# Patient Record
Sex: MALE | Race: WHITE | Employment: OTHER | URBAN - METROPOLITAN AREA
[De-identification: names, ages, dates, MRNs, and addresses within clinical notes are randomized per-mention and may not be internally consistent; named-entity substitution may affect disease eponyms.]

---

## 2018-04-23 ENCOUNTER — OFFICE VISIT (OUTPATIENT)
Dept: FAMILY MEDICINE CLINIC | Age: 63
End: 2018-04-23
Payer: COMMERCIAL

## 2018-04-23 VITALS
WEIGHT: 187 LBS | SYSTOLIC BLOOD PRESSURE: 138 MMHG | HEART RATE: 68 BPM | OXYGEN SATURATION: 98 % | DIASTOLIC BLOOD PRESSURE: 88 MMHG | BODY MASS INDEX: 25.36 KG/M2

## 2018-04-23 DIAGNOSIS — J40 BRONCHITIS: ICD-10-CM

## 2018-04-23 DIAGNOSIS — Z82.49 FAMILY HISTORY OF EARLY CAD: ICD-10-CM

## 2018-04-23 DIAGNOSIS — I10 ESSENTIAL HYPERTENSION: Primary | ICD-10-CM

## 2018-04-23 PROCEDURE — 99213 OFFICE O/P EST LOW 20 MIN: CPT | Performed by: FAMILY MEDICINE

## 2018-04-23 RX ORDER — MELOXICAM 15 MG/1
15 TABLET ORAL DAILY
Qty: 30 TABLET | Refills: 5
Start: 2018-04-23 | End: 2018-06-04 | Stop reason: SDUPTHER

## 2018-04-23 RX ORDER — PREDNISONE 20 MG/1
40 TABLET ORAL DAILY
Qty: 10 TABLET | Refills: 0 | Status: SHIPPED | OUTPATIENT
Start: 2018-04-23 | End: 2018-04-28

## 2018-04-23 RX ORDER — LOSARTAN POTASSIUM 100 MG/1
100 TABLET ORAL DAILY
Qty: 30 TABLET | Refills: 5 | Status: SHIPPED | OUTPATIENT
Start: 2018-04-23 | End: 2018-05-07

## 2018-04-23 RX ORDER — AZITHROMYCIN 250 MG/1
TABLET, FILM COATED ORAL
Qty: 1 PACKET | Refills: 0 | Status: SHIPPED | OUTPATIENT
Start: 2018-04-23 | End: 2018-04-27

## 2018-04-23 ASSESSMENT — ENCOUNTER SYMPTOMS
EYE ITCHING: 0
EYE PAIN: 0
ABDOMINAL PAIN: 0
WHEEZING: 0
COUGH: 1
SORE THROAT: 0
FACIAL SWELLING: 0
CHEST TIGHTNESS: 0
CHOKING: 0
SINUS PRESSURE: 1
SHORTNESS OF BREATH: 0
EYE DISCHARGE: 0
NAUSEA: 0
STRIDOR: 0
BACK PAIN: 0
DIARRHEA: 0
ANAL BLEEDING: 0
ABDOMINAL DISTENTION: 0
RHINORRHEA: 1
TROUBLE SWALLOWING: 0
SINUS PAIN: 1
VOMITING: 0
PHOTOPHOBIA: 0
COLOR CHANGE: 0
BLOOD IN STOOL: 0
VOICE CHANGE: 0
APNEA: 0
CONSTIPATION: 0
RECTAL PAIN: 0
EYE REDNESS: 0

## 2018-04-23 ASSESSMENT — PATIENT HEALTH QUESTIONNAIRE - PHQ9
2. FEELING DOWN, DEPRESSED OR HOPELESS: 0
1. LITTLE INTEREST OR PLEASURE IN DOING THINGS: 0
1. LITTLE INTEREST OR PLEASURE IN DOING THINGS: 0
SUM OF ALL RESPONSES TO PHQ9 QUESTIONS 1 & 2: 0
SUM OF ALL RESPONSES TO PHQ QUESTIONS 1-9: 0
SUM OF ALL RESPONSES TO PHQ9 QUESTIONS 1 & 2: 0
2. FEELING DOWN, DEPRESSED OR HOPELESS: 0
SUM OF ALL RESPONSES TO PHQ QUESTIONS 1-9: 0

## 2018-04-30 DIAGNOSIS — Z12.11 COLON CANCER SCREENING: Primary | ICD-10-CM

## 2018-04-30 LAB
CONTROL: NORMAL
HEMOCCULT STL QL: NEGATIVE

## 2018-04-30 PROCEDURE — 82274 ASSAY TEST FOR BLOOD FECAL: CPT | Performed by: FAMILY MEDICINE

## 2018-05-07 ENCOUNTER — TELEPHONE (OUTPATIENT)
Dept: FAMILY MEDICINE CLINIC | Age: 63
End: 2018-05-07

## 2018-05-07 DIAGNOSIS — R07.9 CHEST PAIN, UNSPECIFIED TYPE: Primary | ICD-10-CM

## 2018-05-09 RX ORDER — HYDROCHLOROTHIAZIDE 25 MG/1
25 TABLET ORAL DAILY
Qty: 30 TABLET | Refills: 5 | Status: SHIPPED | OUTPATIENT
Start: 2018-05-09 | End: 2018-11-14 | Stop reason: SDUPTHER

## 2018-05-11 ENCOUNTER — TELEPHONE (OUTPATIENT)
Dept: FAMILY MEDICINE CLINIC | Age: 63
End: 2018-05-11

## 2018-05-11 DIAGNOSIS — R07.9 CHEST PAIN, UNSPECIFIED TYPE: Primary | ICD-10-CM

## 2018-05-16 ENCOUNTER — HOSPITAL ENCOUNTER (OUTPATIENT)
Dept: NUCLEAR MEDICINE | Age: 63
Discharge: HOME | End: 2018-05-16
Payer: COMMERCIAL

## 2018-05-16 ENCOUNTER — TELEPHONE (OUTPATIENT)
Dept: ADMINISTRATIVE | Age: 63
End: 2018-05-16

## 2018-05-16 ENCOUNTER — HOSPITAL ENCOUNTER (OUTPATIENT)
Dept: NON INVASIVE DIAGNOSTICS | Age: 63
Discharge: HOME | End: 2018-05-16
Payer: COMMERCIAL

## 2018-05-16 VITALS — BODY MASS INDEX: 24.79 KG/M2 | HEIGHT: 72 IN | WEIGHT: 183 LBS

## 2018-05-16 DIAGNOSIS — R07.9 CHEST PAIN, UNSPECIFIED TYPE: ICD-10-CM

## 2018-05-16 LAB
LV EF: 53 %
LVEF MODALITY: NORMAL

## 2018-05-16 PROCEDURE — A9500 TC99M SESTAMIBI: HCPCS | Performed by: RADIOLOGY

## 2018-05-16 PROCEDURE — 3430000000 HC RX DIAGNOSTIC RADIOPHARMACEUTICAL: Performed by: RADIOLOGY

## 2018-05-16 PROCEDURE — 78452 HT MUSCLE IMAGE SPECT MULT: CPT

## 2018-05-16 PROCEDURE — 6360000002 HC RX W HCPCS: Performed by: INTERNAL MEDICINE

## 2018-05-16 PROCEDURE — 93017 CV STRESS TEST TRACING ONLY: CPT

## 2018-05-16 RX ORDER — AMLODIPINE BESYLATE 10 MG/1
10 TABLET ORAL DAILY
Qty: 30 TABLET | Refills: 3 | Status: CANCELLED | OUTPATIENT
Start: 2018-05-16

## 2018-05-16 RX ORDER — AMLODIPINE BESYLATE 10 MG/1
10 TABLET ORAL DAILY
Qty: 30 TABLET | Refills: 3 | OUTPATIENT
Start: 2018-05-16 | End: 2018-06-04

## 2018-05-16 RX ADMIN — REGADENOSON 0.4 MG: 0.08 INJECTION, SOLUTION INTRAVENOUS at 10:17

## 2018-05-16 RX ADMIN — Medication 30 MILLICURIE: at 10:21

## 2018-05-16 RX ADMIN — Medication 10 MILLICURIE: at 08:11

## 2018-05-17 ENCOUNTER — TELEPHONE (OUTPATIENT)
Dept: FAMILY MEDICINE CLINIC | Age: 63
End: 2018-05-17

## 2018-06-04 ENCOUNTER — OFFICE VISIT (OUTPATIENT)
Dept: FAMILY MEDICINE CLINIC | Age: 63
End: 2018-06-04
Payer: COMMERCIAL

## 2018-06-04 DIAGNOSIS — I10 ESSENTIAL HYPERTENSION: ICD-10-CM

## 2018-06-04 DIAGNOSIS — G89.29 CHRONIC BILATERAL LOW BACK PAIN WITHOUT SCIATICA: Primary | ICD-10-CM

## 2018-06-04 DIAGNOSIS — M54.50 CHRONIC BILATERAL LOW BACK PAIN WITHOUT SCIATICA: Primary | ICD-10-CM

## 2018-06-04 PROCEDURE — 99213 OFFICE O/P EST LOW 20 MIN: CPT | Performed by: FAMILY MEDICINE

## 2018-06-04 RX ORDER — MELOXICAM 15 MG/1
15 TABLET ORAL DAILY
Qty: 30 TABLET | Refills: 5 | Status: SHIPPED | OUTPATIENT
Start: 2018-06-04 | End: 2018-11-14 | Stop reason: SDUPTHER

## 2018-06-09 VITALS
HEART RATE: 64 BPM | SYSTOLIC BLOOD PRESSURE: 132 MMHG | OXYGEN SATURATION: 98 % | BODY MASS INDEX: 25.23 KG/M2 | DIASTOLIC BLOOD PRESSURE: 80 MMHG | WEIGHT: 186 LBS

## 2018-06-09 PROBLEM — I10 ESSENTIAL HYPERTENSION: Status: ACTIVE | Noted: 2018-06-09

## 2018-06-09 ASSESSMENT — ENCOUNTER SYMPTOMS
SINUS PRESSURE: 0
STRIDOR: 0
CHEST TIGHTNESS: 0
VOMITING: 0
VOICE CHANGE: 0
CHOKING: 0
ANAL BLEEDING: 0
RECTAL PAIN: 0
RHINORRHEA: 0
CONSTIPATION: 0
BLOOD IN STOOL: 0
COUGH: 0
WHEEZING: 0
NAUSEA: 0
SHORTNESS OF BREATH: 0
APNEA: 0
ABDOMINAL DISTENTION: 0
ABDOMINAL PAIN: 0
DIARRHEA: 0
FACIAL SWELLING: 0
TROUBLE SWALLOWING: 0
SORE THROAT: 0
COLOR CHANGE: 0

## 2018-07-16 ENCOUNTER — TELEPHONE (OUTPATIENT)
Dept: ADMINISTRATIVE | Age: 63
End: 2018-07-16

## 2018-07-16 DIAGNOSIS — I10 ESSENTIAL HYPERTENSION: ICD-10-CM

## 2018-07-16 NOTE — TELEPHONE ENCOUNTER
Pt called to cancel his appt for today 7/16 due to he has another appt that he could not cancel, he is rescheduled for 7/26.

## 2018-07-17 DIAGNOSIS — I10 ESSENTIAL HYPERTENSION: ICD-10-CM

## 2018-07-26 ENCOUNTER — OFFICE VISIT (OUTPATIENT)
Dept: FAMILY MEDICINE CLINIC | Age: 63
End: 2018-07-26
Payer: COMMERCIAL

## 2018-07-26 DIAGNOSIS — I10 ESSENTIAL HYPERTENSION: Primary | ICD-10-CM

## 2018-07-26 PROCEDURE — 99212 OFFICE O/P EST SF 10 MIN: CPT | Performed by: FAMILY MEDICINE

## 2018-07-26 ASSESSMENT — PATIENT HEALTH QUESTIONNAIRE - PHQ9
SUM OF ALL RESPONSES TO PHQ QUESTIONS 1-9: 0
2. FEELING DOWN, DEPRESSED OR HOPELESS: 0
1. LITTLE INTEREST OR PLEASURE IN DOING THINGS: 0
SUM OF ALL RESPONSES TO PHQ9 QUESTIONS 1 & 2: 0

## 2018-07-27 VITALS
HEART RATE: 55 BPM | BODY MASS INDEX: 24.95 KG/M2 | OXYGEN SATURATION: 98 % | DIASTOLIC BLOOD PRESSURE: 80 MMHG | SYSTOLIC BLOOD PRESSURE: 132 MMHG | WEIGHT: 184 LBS

## 2018-07-27 ASSESSMENT — ENCOUNTER SYMPTOMS
STRIDOR: 0
SORE THROAT: 0
COUGH: 0
SINUS PRESSURE: 0
RECTAL PAIN: 0
VOICE CHANGE: 0
CHOKING: 0
DIARRHEA: 0
ABDOMINAL PAIN: 0
NAUSEA: 0
ANAL BLEEDING: 0
SHORTNESS OF BREATH: 0
ABDOMINAL DISTENTION: 0
CHEST TIGHTNESS: 0
RHINORRHEA: 0
APNEA: 0
TROUBLE SWALLOWING: 0
CONSTIPATION: 0
FACIAL SWELLING: 0
WHEEZING: 0
VOMITING: 0
COLOR CHANGE: 0
BLOOD IN STOOL: 0

## 2018-10-04 ENCOUNTER — TELEPHONE (OUTPATIENT)
Dept: FAMILY MEDICINE CLINIC | Age: 63
End: 2018-10-04

## 2018-10-04 NOTE — LETTER
Protective [address] [place] [id number]  Phone: [phone]  Fax: [address], [place]        October 4, 2018    To whom it may concern in [place],    I am writing this letter in regards to my patient, Laly Silveira (1955). I have been following Mr. Torrie Osorio for approximately two years now from a medical standpoint. When Mr. Torrie Osorio was in my office this past April, we were discussing family history medical issues that could significantly impact his health status. It was stated that my patient has a family history of premature coronary artery disease. He was not complaining of chest pain, shortness of breathe, or any other symptoms to warrant cardiac testing at that time. The cardiac stress test, known as Estuardo Brown, was ordered for the sole purpose of a preventative measure given the fact that he has family members whom suffered from premature cardiac disease. Patient was subsequently seen in my office for symptoms of dizziness related to antihypertensive medication changes. At no time was he experiencing chest pain. Please do not hesitate to call my office at the above number and speak with my nurse, Rabia Macedo RN, at extension [phone]. Thank you for your time and consideration in this matter.        Sincerely,          DO Rabia Andrew RN

## 2018-11-12 DIAGNOSIS — M54.50 CHRONIC BILATERAL LOW BACK PAIN WITHOUT SCIATICA: ICD-10-CM

## 2018-11-12 DIAGNOSIS — G89.29 CHRONIC BILATERAL LOW BACK PAIN WITHOUT SCIATICA: ICD-10-CM

## 2018-11-12 DIAGNOSIS — I10 ESSENTIAL HYPERTENSION: ICD-10-CM

## 2018-11-13 ENCOUNTER — TELEPHONE (OUTPATIENT)
Dept: FAMILY MEDICINE CLINIC | Age: 63
End: 2018-11-13

## 2018-11-13 DIAGNOSIS — G89.29 CHRONIC BILATERAL LOW BACK PAIN WITHOUT SCIATICA: ICD-10-CM

## 2018-11-13 DIAGNOSIS — M54.50 CHRONIC BILATERAL LOW BACK PAIN WITHOUT SCIATICA: ICD-10-CM

## 2018-11-13 DIAGNOSIS — I10 ESSENTIAL HYPERTENSION: ICD-10-CM

## 2018-11-13 RX ORDER — HYDROCHLOROTHIAZIDE 25 MG/1
25 TABLET ORAL DAILY
Qty: 30 TABLET | Refills: 5 | Status: CANCELLED | OUTPATIENT
Start: 2018-11-13

## 2018-11-13 RX ORDER — MELOXICAM 15 MG/1
15 TABLET ORAL DAILY
Qty: 30 TABLET | Refills: 5 | Status: CANCELLED | OUTPATIENT
Start: 2018-11-13

## 2018-11-14 RX ORDER — HYDROCHLOROTHIAZIDE 25 MG/1
25 TABLET ORAL DAILY
Qty: 30 TABLET | Refills: 1 | Status: SHIPPED | OUTPATIENT
Start: 2018-11-14 | End: 2019-01-17 | Stop reason: SDUPTHER

## 2018-11-14 RX ORDER — MELOXICAM 15 MG/1
15 TABLET ORAL DAILY
Qty: 30 TABLET | Refills: 1 | Status: SHIPPED | OUTPATIENT
Start: 2018-11-14 | End: 2019-03-11 | Stop reason: SDUPTHER

## 2019-01-17 DIAGNOSIS — I10 ESSENTIAL HYPERTENSION: ICD-10-CM

## 2019-01-18 RX ORDER — HYDROCHLOROTHIAZIDE 25 MG/1
25 TABLET ORAL DAILY
Qty: 30 TABLET | Refills: 1 | Status: SHIPPED | OUTPATIENT
Start: 2019-01-18 | End: 2019-03-15 | Stop reason: SDUPTHER

## 2019-03-11 DIAGNOSIS — M54.50 CHRONIC BILATERAL LOW BACK PAIN WITHOUT SCIATICA: ICD-10-CM

## 2019-03-11 DIAGNOSIS — G89.29 CHRONIC BILATERAL LOW BACK PAIN WITHOUT SCIATICA: ICD-10-CM

## 2019-03-11 RX ORDER — MELOXICAM 15 MG/1
15 TABLET ORAL DAILY
Qty: 90 TABLET | Refills: 0 | Status: SHIPPED | OUTPATIENT
Start: 2019-03-11 | End: 2019-03-15 | Stop reason: SDUPTHER

## 2019-03-15 DIAGNOSIS — M54.50 CHRONIC BILATERAL LOW BACK PAIN WITHOUT SCIATICA: ICD-10-CM

## 2019-03-15 DIAGNOSIS — I10 ESSENTIAL HYPERTENSION: ICD-10-CM

## 2019-03-15 DIAGNOSIS — G89.29 CHRONIC BILATERAL LOW BACK PAIN WITHOUT SCIATICA: ICD-10-CM

## 2019-03-15 RX ORDER — MELOXICAM 15 MG/1
15 TABLET ORAL DAILY
Qty: 30 TABLET | Refills: 0 | Status: SHIPPED
Start: 2019-03-15 | End: 2020-11-16

## 2019-03-15 RX ORDER — HYDROCHLOROTHIAZIDE 25 MG/1
25 TABLET ORAL DAILY
Qty: 30 TABLET | Refills: 1 | Status: SHIPPED | OUTPATIENT
Start: 2019-03-15 | End: 2019-04-22 | Stop reason: SDUPTHER

## 2019-04-22 ENCOUNTER — OFFICE VISIT (OUTPATIENT)
Dept: FAMILY MEDICINE CLINIC | Age: 64
End: 2019-04-22
Payer: COMMERCIAL

## 2019-04-22 VITALS
HEIGHT: 72 IN | HEART RATE: 68 BPM | BODY MASS INDEX: 25.87 KG/M2 | DIASTOLIC BLOOD PRESSURE: 80 MMHG | SYSTOLIC BLOOD PRESSURE: 120 MMHG | OXYGEN SATURATION: 96 % | WEIGHT: 191 LBS

## 2019-04-22 DIAGNOSIS — R20.2 PARESTHESIA AND PAIN OF BOTH UPPER EXTREMITIES: Primary | ICD-10-CM

## 2019-04-22 DIAGNOSIS — M79.602 PARESTHESIA AND PAIN OF BOTH UPPER EXTREMITIES: Primary | ICD-10-CM

## 2019-04-22 DIAGNOSIS — M79.601 PARESTHESIA AND PAIN OF BOTH UPPER EXTREMITIES: Primary | ICD-10-CM

## 2019-04-22 DIAGNOSIS — I10 ESSENTIAL HYPERTENSION: ICD-10-CM

## 2019-04-22 PROCEDURE — 99213 OFFICE O/P EST LOW 20 MIN: CPT | Performed by: FAMILY MEDICINE

## 2019-04-22 RX ORDER — TIZANIDINE 4 MG/1
4 TABLET ORAL NIGHTLY
Qty: 30 TABLET | Refills: 5 | Status: SHIPPED
Start: 2019-04-22 | End: 2020-11-16

## 2019-04-22 RX ORDER — HYDROCHLOROTHIAZIDE 25 MG/1
25 TABLET ORAL DAILY
Qty: 90 TABLET | Refills: 5 | Status: SHIPPED
Start: 2019-04-22 | End: 2020-05-14 | Stop reason: SDUPTHER

## 2019-04-22 RX ORDER — DEXAMETHASONE 4 MG/1
4 TABLET ORAL 2 TIMES DAILY WITH MEALS
Qty: 28 TABLET | Refills: 0 | Status: SHIPPED | OUTPATIENT
Start: 2019-04-22 | End: 2019-05-06

## 2019-04-22 RX ORDER — TIZANIDINE 4 MG/1
4 TABLET ORAL NIGHTLY
Qty: 30 TABLET | Refills: 5 | Status: SHIPPED | OUTPATIENT
Start: 2019-04-22 | End: 2019-04-22 | Stop reason: SDUPTHER

## 2019-04-22 ASSESSMENT — PATIENT HEALTH QUESTIONNAIRE - PHQ9
1. LITTLE INTEREST OR PLEASURE IN DOING THINGS: 0
SUM OF ALL RESPONSES TO PHQ9 QUESTIONS 1 & 2: 0
2. FEELING DOWN, DEPRESSED OR HOPELESS: 0
SUM OF ALL RESPONSES TO PHQ QUESTIONS 1-9: 0
SUM OF ALL RESPONSES TO PHQ QUESTIONS 1-9: 0

## 2019-04-23 ASSESSMENT — ENCOUNTER SYMPTOMS: SHORTNESS OF BREATH: 0

## 2019-04-24 NOTE — PROGRESS NOTES
Smoker    Smokeless tobacco: Never Used   Substance and Sexual Activity    Alcohol use: Yes     Comment: socailly    Drug use: No    Sexual activity: Not on file   Lifestyle    Physical activity:     Days per week: Not on file     Minutes per session: Not on file    Stress: Not on file   Relationships    Social connections:     Talks on phone: Not on file     Gets together: Not on file     Attends Shinto service: Not on file     Active member of club or organization: Not on file     Attends meetings of clubs or organizations: Not on file     Relationship status: Not on file    Intimate partner violence:     Fear of current or ex partner: Not on file     Emotionally abused: Not on file     Physically abused: Not on file     Forced sexual activity: Not on file   Other Topics Concern    Not on file   Social History Narrative    Not on file       Family History   Problem Relation Age of Onset    COPD Father     Emphysema Father     Coronary Art Dis Maternal Grandfather        Current Outpatient Medications on File Prior to Visit   Medication Sig Dispense Refill    meloxicam (MOBIC) 15 MG tablet Take 1 tablet by mouth daily 30 tablet 0     No current facility-administered medications on file prior to visit. Allergies   Allergen Reactions    Cozaar [Losartan] Other (See Comments)     Dizziness, lightheaded, jittery       I have reviewed his allergies, medications, problem list, medical,social and family history and have updated as needed in the electronic medical record. Objective:     Physical Exam   Constitutional: He appears well-developed and well-nourished. No distress. HENT:   Head: Normocephalic and atraumatic. Right Ear: External ear normal.   Left Ear: External ear normal.   Nose: Nose normal.   Mouth/Throat: Oropharynx is clear and moist. No oropharyngeal exudate. Cardiovascular: Normal rate, regular rhythm and intact distal pulses. Exam reveals no gallop and no friction rub.

## 2020-05-14 ENCOUNTER — VIRTUAL VISIT (OUTPATIENT)
Dept: FAMILY MEDICINE CLINIC | Age: 65
End: 2020-05-14
Payer: COMMERCIAL

## 2020-05-14 VITALS — HEIGHT: 72 IN | WEIGHT: 187 LBS | BODY MASS INDEX: 25.33 KG/M2

## 2020-05-14 PROCEDURE — 99213 OFFICE O/P EST LOW 20 MIN: CPT | Performed by: FAMILY MEDICINE

## 2020-05-14 RX ORDER — HYOSCYAMINE SULFATE 0.125 MG
0.12 TABLET ORAL EVERY 4 HOURS PRN
Qty: 90 TABLET | Refills: 5 | Status: SHIPPED
Start: 2020-05-14 | End: 2020-11-16

## 2020-05-14 RX ORDER — HYDROCHLOROTHIAZIDE 25 MG/1
25 TABLET ORAL DAILY
Qty: 90 TABLET | Refills: 5 | Status: ON HOLD
Start: 2020-05-14 | End: 2020-11-17 | Stop reason: HOSPADM

## 2020-05-14 RX ORDER — PROPRANOLOL HCL 60 MG
60 CAPSULE, EXTENDED RELEASE 24HR ORAL DAILY
Qty: 30 CAPSULE | Refills: 5 | Status: SHIPPED
Start: 2020-05-14 | End: 2020-05-15

## 2020-05-14 ASSESSMENT — PATIENT HEALTH QUESTIONNAIRE - PHQ9
SUM OF ALL RESPONSES TO PHQ QUESTIONS 1-9: 0
SUM OF ALL RESPONSES TO PHQ QUESTIONS 1-9: 0
1. LITTLE INTEREST OR PLEASURE IN DOING THINGS: 0
SUM OF ALL RESPONSES TO PHQ9 QUESTIONS 1 & 2: 0
2. FEELING DOWN, DEPRESSED OR HOPELESS: 0

## 2020-05-15 RX ORDER — PROPRANOLOL HCL 60 MG
60 CAPSULE, EXTENDED RELEASE 24HR ORAL DAILY
Qty: 90 CAPSULE | Refills: 5 | Status: ON HOLD
Start: 2020-05-15 | End: 2020-11-17 | Stop reason: HOSPADM

## 2020-05-19 ASSESSMENT — ENCOUNTER SYMPTOMS
NAUSEA: 0
CONSTIPATION: 0
COUGH: 0
ANAL BLEEDING: 0
FACIAL SWELLING: 0
RHINORRHEA: 0
EYE DISCHARGE: 0
CHEST TIGHTNESS: 0
ABDOMINAL DISTENTION: 0
COLOR CHANGE: 0
EYE PAIN: 0
VOMITING: 0
BLOOD IN STOOL: 0
TROUBLE SWALLOWING: 0
WHEEZING: 0
BACK PAIN: 0
STRIDOR: 0
SORE THROAT: 0
SINUS PRESSURE: 0
PHOTOPHOBIA: 0
RECTAL PAIN: 0
CHOKING: 0
VOICE CHANGE: 0
EYE ITCHING: 0
EYE REDNESS: 0
ABDOMINAL PAIN: 0
APNEA: 0
DIARRHEA: 1
SHORTNESS OF BREATH: 0

## 2020-05-19 NOTE — PROGRESS NOTES
TeleMedicine Video Visit    This visit was performed as a virtual video visit using a synchronous, two-way, audio-video telehealth technology platform. Patient identification was verified at the start of the visit, including the patient's telephone number and physical location. I discussed with the patient the nature of our telehealth visits, that:     1. Due to the nature of an audio- video modality, the only components of a physical exam that could be done are the elements supported by direct observation. 2. I would evaluate the patient and recommend diagnostics and treatments based on my assessment. 3. If it was felt that the patient should be evaluated in clinic or an emergency room setting, then they would be directed there. 4. Our sessions are not being recorded and that personal health information is protected. 5. Our team would provide follow up care in person if/when the patient needs it. Patient does agree to proceed with telemedicine consultation. Patient's location: home address in [facility]  Physician  location other address in [facility] other people involved in call, patient only      Time spent: Greater than 15    This visit was completed virtually using Doxy. me   Augustus Ayala is a 60 y.o. male  . Subjective:      Patients blood pressure has actually really improved. Was feeling well on propranolol. However, the has been having low heart rate in 40s. No real orthostatic complaints, however we discussed at length lowering dose. He will watch blood pressure and report any increase. Main concern is IBS with certain foods. Hypertension   This is a chronic problem. The current episode started more than 1 year ago. The problem is unchanged. The problem is controlled. Pertinent negatives include no chest pain, headaches, neck pain, palpitations or shortness of breath. There are no associated agents to hypertension. Risk factors for coronary artery disease include male gender and dyslipidemia.  Past Comments: No rash   Neurological:      General: No focal deficit present. Mental Status: He is alert and oriented to person, place, and time. Psychiatric:         Mood and Affect: Mood normal.         Behavior: Behavior normal.         Assessment / Plan:   Sharyle Sola was seen today for hypertension and irritable bowel syndrome. Diagnoses and all orders for this visit:    Diarrhea, unspecified type  -     Cologuard (For External Results Only); Future  -     Comprehensive Metabolic Panel; Future  -     CBC Auto Differential; Future  -     Hemoglobin A1C; Future  -     TSH without Reflex; Future  -     Lipid Panel; Future  -     Psa screening; Future  -     GLIADIN ANTIBODIES, SERUM; Future    Essential hypertension  -     hydroCHLOROthiazide (HYDRODIURIL) 25 MG tablet; Take 1 tablet by mouth daily  -     Cologuard (For External Results Only); Future  -     Comprehensive Metabolic Panel; Future  -     CBC Auto Differential; Future  -     Hemoglobin A1C; Future  -     TSH without Reflex; Future  -     Lipid Panel; Future  -     Psa screening; Future  -     GLIADIN ANTIBODIES, SERUM; Future    Irritable bowel syndrome with diarrhea  -     Cologuard (For External Results Only); Future  -     Comprehensive Metabolic Panel; Future  -     CBC Auto Differential; Future  -     Hemoglobin A1C; Future  -     TSH without Reflex; Future  -     Lipid Panel; Future  -     Psa screening; Future  -     GLIADIN ANTIBODIES, SERUM; Future  -     hyoscyamine (LEVSIN) 125 MCG tablet; Take 1 tablet by mouth every 4 hours as needed for Cramping or Diarrhea    IFG (impaired fasting glucose)  -     Cologuard (For External Results Only); Future  -     Comprehensive Metabolic Panel; Future  -     CBC Auto Differential; Future  -     Hemoglobin A1C; Future  -     TSH without Reflex; Future  -     Lipid Panel; Future  -     Psa screening;  Future  -     GLIADIN ANTIBODIES, SERUM; Future    Mixed hyperlipidemia  -     Cologuard (For External

## 2020-07-21 ENCOUNTER — TELEPHONE (OUTPATIENT)
Dept: FAMILY MEDICINE CLINIC | Age: 65
End: 2020-07-21

## 2020-07-21 RX ORDER — METRONIDAZOLE 500 MG/1
500 TABLET ORAL 3 TIMES DAILY
Qty: 30 TABLET | Refills: 0 | Status: SHIPPED | OUTPATIENT
Start: 2020-07-21 | End: 2020-07-31

## 2020-07-21 RX ORDER — DIPHENOXYLATE HYDROCHLORIDE AND ATROPINE SULFATE 2.5; .025 MG/1; MG/1
TABLET ORAL
Qty: 90 TABLET | Refills: 3 | Status: SHIPPED | OUTPATIENT
Start: 2020-07-21 | End: 2020-07-31

## 2020-07-21 NOTE — TELEPHONE ENCOUNTER
Patient phoned stating that he has been having diarrhea since around 4th of July. Happens after eating and between. Stomach feels like it is in knots. Has had these \"flair ups\" before but is becoming more frequent. Has tried imodium and the prescribed hyoscyamine and nothing helps. Please advise. Thanks.

## 2020-07-22 NOTE — TELEPHONE ENCOUNTER
LMOM for patient that new medication was sent to the pharmacy and if does not work or has any other questions to please contact office.

## 2020-08-04 ENCOUNTER — HOSPITAL ENCOUNTER (OUTPATIENT)
Age: 65
Discharge: HOME | End: 2020-08-06
Payer: COMMERCIAL

## 2020-08-04 LAB
ALBUMIN SERPL-MCNC: 4.4 G/DL (ref 3.5–5.2)
ALP BLD-CCNC: 94 U/L (ref 40–129)
ALT SERPL-CCNC: 41 U/L (ref 0–40)
ANION GAP SERPL CALCULATED.3IONS-SCNC: 17 MMOL/L (ref 7–16)
AST SERPL-CCNC: 39 U/L (ref 0–39)
BASOPHILS ABSOLUTE: 0.03 E9/L (ref 0–0.2)
BASOPHILS RELATIVE PERCENT: 0.5 % (ref 0–2)
BILIRUB SERPL-MCNC: 0.4 MG/DL (ref 0–1.2)
BUN BLDV-MCNC: 13 MG/DL (ref 8–23)
CALCIUM SERPL-MCNC: 9.9 MG/DL (ref 8.6–10.2)
CHLORIDE BLD-SCNC: 99 MMOL/L (ref 98–107)
CHOLESTEROL, TOTAL: 199 MG/DL (ref 0–199)
CO2: 22 MMOL/L (ref 22–29)
CREAT SERPL-MCNC: 1 MG/DL (ref 0.7–1.2)
EOSINOPHILS ABSOLUTE: 0.09 E9/L (ref 0.05–0.5)
EOSINOPHILS RELATIVE PERCENT: 1.4 % (ref 0–6)
GFR AFRICAN AMERICAN: >60
GFR NON-AFRICAN AMERICAN: >60 ML/MIN/1.73
GLUCOSE BLD-MCNC: 151 MG/DL (ref 74–99)
HBA1C MFR BLD: 6.2 % (ref 4–5.6)
HCT VFR BLD CALC: 46.6 % (ref 37–54)
HDLC SERPL-MCNC: 39 MG/DL
HEMOGLOBIN: 15.6 G/DL (ref 12.5–16.5)
IMMATURE GRANULOCYTES #: 0.03 E9/L
IMMATURE GRANULOCYTES %: 0.5 % (ref 0–5)
LDL CHOLESTEROL CALCULATED: 125 MG/DL (ref 0–99)
LYMPHOCYTES ABSOLUTE: 0.79 E9/L (ref 1.5–4)
LYMPHOCYTES RELATIVE PERCENT: 12.2 % (ref 20–42)
MCH RBC QN AUTO: 30.8 PG (ref 26–35)
MCHC RBC AUTO-ENTMCNC: 33.5 % (ref 32–34.5)
MCV RBC AUTO: 91.9 FL (ref 80–99.9)
MONOCYTES ABSOLUTE: 0.67 E9/L (ref 0.1–0.95)
MONOCYTES RELATIVE PERCENT: 10.3 % (ref 2–12)
NEUTROPHILS ABSOLUTE: 4.88 E9/L (ref 1.8–7.3)
NEUTROPHILS RELATIVE PERCENT: 75.1 % (ref 43–80)
PDW BLD-RTO: 12.6 FL (ref 11.5–15)
PLATELET # BLD: 269 E9/L (ref 130–450)
PMV BLD AUTO: 10.6 FL (ref 7–12)
POTASSIUM SERPL-SCNC: 4.5 MMOL/L (ref 3.5–5)
PROSTATE SPECIFIC ANTIGEN: 0.54 NG/ML (ref 0–4)
RBC # BLD: 5.07 E12/L (ref 3.8–5.8)
SODIUM BLD-SCNC: 138 MMOL/L (ref 132–146)
TOTAL PROTEIN: 7.1 G/DL (ref 6.4–8.3)
TRIGL SERPL-MCNC: 177 MG/DL (ref 0–149)
TSH SERPL DL<=0.05 MIU/L-ACNC: 2.07 UIU/ML (ref 0.27–4.2)
VLDLC SERPL CALC-MCNC: 35 MG/DL
WBC # BLD: 6.5 E9/L (ref 4.5–11.5)

## 2020-08-04 PROCEDURE — 36415 COLL VENOUS BLD VENIPUNCTURE: CPT

## 2020-08-04 PROCEDURE — 80053 COMPREHEN METABOLIC PANEL: CPT

## 2020-08-04 PROCEDURE — G0103 PSA SCREENING: HCPCS

## 2020-08-04 PROCEDURE — 85025 COMPLETE CBC W/AUTO DIFF WBC: CPT

## 2020-08-04 PROCEDURE — 83516 IMMUNOASSAY NONANTIBODY: CPT

## 2020-08-04 PROCEDURE — 84443 ASSAY THYROID STIM HORMONE: CPT

## 2020-08-04 PROCEDURE — 80061 LIPID PANEL: CPT

## 2020-08-04 PROCEDURE — 83036 HEMOGLOBIN GLYCOSYLATED A1C: CPT

## 2020-08-06 LAB
GLIADIN ANTIBODIES IGA: 3 UNITS (ref 0–19)
GLIADIN ANTIBODIES IGG: 2 UNITS (ref 0–19)

## 2020-09-02 ENCOUNTER — VIRTUAL VISIT (OUTPATIENT)
Dept: FAMILY MEDICINE CLINIC | Age: 65
End: 2020-09-02
Payer: COMMERCIAL

## 2020-09-02 PROCEDURE — 99213 OFFICE O/P EST LOW 20 MIN: CPT | Performed by: FAMILY MEDICINE

## 2020-09-02 RX ORDER — DICYCLOMINE HYDROCHLORIDE 10 MG/1
10 CAPSULE ORAL 4 TIMES DAILY
Qty: 120 CAPSULE | Refills: 3 | Status: SHIPPED
Start: 2020-09-02 | End: 2020-11-16 | Stop reason: ALTCHOICE

## 2020-09-09 ASSESSMENT — ENCOUNTER SYMPTOMS
NAUSEA: 0
SORE THROAT: 0
RHINORRHEA: 0
SHORTNESS OF BREATH: 0
COUGH: 0
APNEA: 0
ABDOMINAL PAIN: 0
EYE PAIN: 0
CHEST TIGHTNESS: 0
BACK PAIN: 0
FACIAL SWELLING: 0
RECTAL PAIN: 0
VOMITING: 0
ABDOMINAL DISTENTION: 0
EYE REDNESS: 0
DIARRHEA: 1
TROUBLE SWALLOWING: 0
ANAL BLEEDING: 0
CONSTIPATION: 0
PHOTOPHOBIA: 0
CHOKING: 0
STRIDOR: 0
EYE ITCHING: 0
SINUS PRESSURE: 0
VOICE CHANGE: 0
COLOR CHANGE: 0
EYE DISCHARGE: 0
BLOOD IN STOOL: 0
WHEEZING: 0

## 2020-09-09 NOTE — PROGRESS NOTES
TeleMedicine Video Visit    This visit was performed as a virtual video visit using a synchronous, two-way, audio-video telehealth technology platform. Patient identification was verified at the start of the visit, including the patient's telephone number and physical location. I discussed with the patient the nature of our telehealth visits, that:     1. Due to the nature of an audio- video modality, the only components of a physical exam that could be done are the elements supported by direct observation. 2. I would evaluate the patient and recommend diagnostics and treatments based on my assessment. 3. If it was felt that the patient should be evaluated in clinic or an emergency room setting, then they would be directed there. 4. Our sessions are not being recorded and that personal health information is protected. 5. Our team would provide follow up care in person if/when the patient needs it. Patient does agree to proceed with telemedicine consultation. Patient's location: home address in [address] [place] [address] Dr  Physician  location other address in [facility] other people involved in call, patient only      Time spent: Greater than 15    This visit was completed virtually using Doxy. manuela Bullock is a 60 y.o. male  . Subjective:      Diarrhea    This is a chronic problem. The current episode started more than 1 year ago. The problem occurs 2 to 4 times per day. The problem has been gradually worsening. The stool consistency is described as watery. The patient states that diarrhea awakens him from sleep. Pertinent negatives include no abdominal pain, arthralgias, chills, coughing, fever, headaches, myalgias or vomiting. Nothing aggravates the symptoms. He has tried nothing for the symptoms. The treatment provided no relief. His past medical history is significant for irritable bowel syndrome.        Review of Systems   Constitutional: Negative for activity change, appetite change, chills, diaphoresis, fatigue, fever and unexpected weight change. HENT: Negative for congestion, dental problem, drooling, ear discharge, ear pain, facial swelling, hearing loss, mouth sores, nosebleeds, postnasal drip, rhinorrhea, sinus pressure, sneezing, sore throat, tinnitus, trouble swallowing and voice change. Eyes: Negative for photophobia, pain, discharge, redness, itching and visual disturbance. Respiratory: Negative for apnea, cough, choking, chest tightness, shortness of breath, wheezing and stridor. Cardiovascular: Negative for chest pain, palpitations and leg swelling. Gastrointestinal: Positive for diarrhea. Negative for abdominal distention, abdominal pain, anal bleeding, blood in stool, constipation, nausea, rectal pain and vomiting. Endocrine: Negative for cold intolerance, heat intolerance, polydipsia, polyphagia and polyuria. Genitourinary: Negative for decreased urine volume, difficulty urinating, discharge, dysuria, enuresis, flank pain, frequency, genital sores, hematuria, penile pain, penile swelling, scrotal swelling, testicular pain and urgency. Musculoskeletal: Negative for arthralgias, back pain, gait problem, joint swelling, myalgias, neck pain and neck stiffness. Skin: Negative for color change, pallor, rash and wound. Allergic/Immunologic: Negative for environmental allergies, food allergies and immunocompromised state. Neurological: Negative for dizziness, tremors, seizures, syncope, facial asymmetry, speech difficulty, weakness, light-headedness, numbness and headaches. Hematological: Negative for adenopathy. Does not bruise/bleed easily. Psychiatric/Behavioral: Negative for agitation, behavioral problems, confusion, decreased concentration, dysphoric mood, hallucinations, self-injury, sleep disturbance and suicidal ideas. The patient is not nervous/anxious and is not hyperactive.         Past Medical History:   Diagnosis Date    H/O cardiovascular stress test 05/16/2018    lexiscan    Heartburn        Social History     Socioeconomic History    Marital status:      Spouse name: Not on file    Number of children: Not on file    Years of education: Not on file    Highest education level: Not on file   Occupational History    Not on file   Social Needs    Financial resource strain: Not on file    Food insecurity     Worry: Not on file     Inability: Not on file    Transportation needs     Medical: Not on file     Non-medical: Not on file   Tobacco Use    Smoking status: Never Smoker    Smokeless tobacco: Never Used   Substance and Sexual Activity    Alcohol use: Yes     Comment: socailly    Drug use: No    Sexual activity: Not on file   Lifestyle    Physical activity     Days per week: Not on file     Minutes per session: Not on file    Stress: Not on file   Relationships    Social connections     Talks on phone: Not on file     Gets together: Not on file     Attends Restorationist service: Not on file     Active member of club or organization: Not on file     Attends meetings of clubs or organizations: Not on file     Relationship status: Not on file    Intimate partner violence     Fear of current or ex partner: Not on file     Emotionally abused: Not on file     Physically abused: Not on file     Forced sexual activity: Not on file   Other Topics Concern    Not on file   Social History Narrative    Not on file       Family History   Problem Relation Age of Onset    COPD Father     Emphysema Father     Coronary Art Dis Maternal Grandfather        Current Outpatient Medications on File Prior to Visit   Medication Sig Dispense Refill    propranolol (INDERAL LA) 60 MG extended release capsule TAKE 1 CAPSULE BY MOUTH DAILY 90 capsule 5    hydroCHLOROthiazide (HYDRODIURIL) 25 MG tablet Take 1 tablet by mouth daily 90 tablet 5    hyoscyamine (LEVSIN) 125 MCG tablet Take 1 tablet by mouth every 4 hours as needed for Cramping or Diarrhea 90 tablet 5    propranolol (INDERAL XL) 120 MG extended release capsule Take 1 capsule by mouth nightly 90 capsule 5    tiZANidine (ZANAFLEX) 4 MG tablet Take 1 tablet by mouth nightly 30 tablet 5    meloxicam (MOBIC) 15 MG tablet Take 1 tablet by mouth daily 30 tablet 0     No current facility-administered medications on file prior to visit. Allergies   Allergen Reactions    Cozaar [Losartan] Other (See Comments)     Dizziness, lightheaded, jittery       I have reviewed his allergies, medications, problem list, medical,social and family history and have updated as needed in the electronic medical record. Objective:     Physical Exam  Constitutional:       General: He is not in acute distress. Appearance: Normal appearance. He is normal weight. He is not ill-appearing, toxic-appearing or diaphoretic. HENT:      Head: Normocephalic and atraumatic. Pulmonary:      Comments: No respiratory distress  Skin:     Comments: No rash, no lesion   Neurological:      General: No focal deficit present. Mental Status: He is alert and oriented to person, place, and time. Psychiatric:         Mood and Affect: Mood normal.         Behavior: Behavior normal.         Thought Content: Thought content normal.         Judgment: Judgment normal.         Assessment / Plan:   Rj Osborne was seen today for discuss labs. Diagnoses and all orders for this visit:    Irritable bowel syndrome with diarrhea  -     Comprehensive Metabolic Panel; Future  -     CBC Auto Differential; Future  -     Hemoglobin A1C; Future  -     TSH without Reflex; Future  -     dicyclomine (BENTYL) 10 MG capsule; Take 1 capsule by mouth 4 times daily  -     Mervin Gamez DO, Wyvonna Hove (TEREZA)    Functional diarrhea  -     dicyclomine (BENTYL) 10 MG capsule; Take 1 capsule by mouth 4 times daily  -     Madeline Gamez DO, Wyvonna Hove (TEREZA)    Essential hypertension    Acute pain of both knees  -     diclofenac sodium (VOLTAREN) 1 % GEL;  Apply 2 g topically 4 times daily        Reviewed healthmaintenance report. Patient is aware of deficiencies and suggested preventative tests.

## 2020-10-30 ENCOUNTER — NURSE TRIAGE (OUTPATIENT)
Dept: OTHER | Facility: CLINIC | Age: 65
End: 2020-10-30

## 2020-10-30 ENCOUNTER — TELEPHONE (OUTPATIENT)
Dept: ADMINISTRATIVE | Age: 65
End: 2020-10-30

## 2020-10-30 NOTE — TELEPHONE ENCOUNTER
Nurse Triage call from [facility], pt needs seen by PCP for chest discomfort x 1 month. Appt scheduled for 11/2 w/ ibeth Hartman per nurse.

## 2020-10-30 NOTE — TELEPHONE ENCOUNTER
Chest discomfort for 1 month, increased with activity. Hx of GERD, takes prilosec. Denies dizziness, lightheadedness, nausea. Denies radiating pain. Reason for Disposition   Patient wants to be seen    Answer Assessment - Initial Assessment Questions  1. LOCATION: \"Where does it hurt? \"        Rib cage up to throat in center    2. RADIATION: \"Does the pain go anywhere else? \" (e.g., into neck, jaw, arms, back)      Denies    3. ONSET: \"When did the chest pain begin? \" (Minutes, hours or days)       See above    4. PATTERN \"Does the pain come and go, or has it been constant since it started? \"  \"Does it get worse with exertion? \"       Comes and goes    5. DURATION: \"How long does it last\" (e.g., seconds, minutes, hours)      Varies    6. SEVERITY: \"How bad is the pain? \"  (e.g., Scale 1-10; mild, moderate, or severe)     - MILD (1-3): doesn't interfere with normal activities      - MODERATE (4-7): interferes with normal activities or awakens from sleep     - SEVERE (8-10): excruciating pain, unable to do any normal activities        Moderate    7. CARDIAC RISK FACTORS: \"Do you have any history of heart problems or risk factors for heart disease? \" (e.g., angina, prior heart attack; diabetes, high blood pressure, high cholesterol, smoker, or strong family history of heart disease)      HTN    8. PULMONARY RISK FACTORS: \"Do you have any history of lung disease? \"  (e.g., blood clots in lung, asthma, emphysema, birth control pills)      Denies    9. CAUSE: \"What do you think is causing the chest pain? \"      gerd    10. OTHER SYMPTOMS: \"Do you have any other symptoms? \" (e.g., dizziness, nausea, vomiting, sweating, fever, difficulty breathing, cough)        Denies    11. PREGNANCY: \"Is there any chance you are pregnant? \" \"When was your last menstrual period? \"        Na    Protocols used: CHEST PAIN-ADULT-[place]    Patient called pre-service center [facility]) to schedule appointment, with red flag complaint, transferred to RN access for triage. See above questions and answers. Caller talking full sentences without any distress on phone. Discussed disposition and patient agreeable. Discussed potential consequences for not following disposition recommendation. Aware to call back with any concerns or persistent, worsening, or new symptoms develop. Warm transfer to [facility] scheduling for appointment. Attention Provider: Thank you for allowing me to participate in the care of your patient. The  patient was connected to triage in response to information provided to the E[facility]. Please do not respond through this encounter as the response is not directed to a shared pool.

## 2020-11-02 ENCOUNTER — OFFICE VISIT (OUTPATIENT)
Dept: FAMILY MEDICINE CLINIC | Age: 65
End: 2020-11-02
Payer: COMMERCIAL

## 2020-11-02 VITALS
OXYGEN SATURATION: 99 % | HEART RATE: 51 BPM | WEIGHT: 187 LBS | SYSTOLIC BLOOD PRESSURE: 124 MMHG | DIASTOLIC BLOOD PRESSURE: 80 MMHG | HEIGHT: 72 IN | RESPIRATION RATE: 18 BRPM | BODY MASS INDEX: 25.33 KG/M2

## 2020-11-02 PROCEDURE — 99214 OFFICE O/P EST MOD 30 MIN: CPT | Performed by: FAMILY MEDICINE

## 2020-11-02 RX ORDER — PANTOPRAZOLE SODIUM 40 MG/1
40 TABLET, DELAYED RELEASE ORAL
Qty: 30 TABLET | Refills: 5 | Status: SHIPPED
Start: 2020-11-02 | End: 2020-11-30 | Stop reason: ALTCHOICE

## 2020-11-04 ASSESSMENT — ENCOUNTER SYMPTOMS
COUGH: 0
CONSTIPATION: 0
EYE ITCHING: 0
ABDOMINAL PAIN: 0
EYE REDNESS: 0
HEARTBURN: 1
ABDOMINAL DISTENTION: 0
SHORTNESS OF BREATH: 0
COLOR CHANGE: 0
VOICE CHANGE: 0
DIARRHEA: 1
WHEEZING: 0
SORE THROAT: 0
STRIDOR: 0
BLOOD IN STOOL: 0
TROUBLE SWALLOWING: 0
PHOTOPHOBIA: 0
FACIAL SWELLING: 0
VOMITING: 0
BLOATING: 1
CHEST TIGHTNESS: 0
CHOKING: 0
RHINORRHEA: 0
ANAL BLEEDING: 0
EYE PAIN: 0
RECTAL PAIN: 0
SINUS PRESSURE: 0
EYE DISCHARGE: 0
APNEA: 0
NAUSEA: 0
BACK PAIN: 0

## 2020-11-05 NOTE — PROGRESS NOTES
Naila Vaughn is a 60 y.o. male  . Subjective:      Chest Pain    This is a recurrent problem. The current episode started more than 1 month ago. The onset quality is gradual. The problem occurs intermittently (random. not anginal in nature). The problem has been waxing and waning. The pain is present in the substernal region. The pain is mild. The quality of the pain is described as sharp. The pain does not radiate. Pertinent negatives include no abdominal pain, back pain, cough, diaphoresis, dizziness, fever, headaches, nausea, numbness, palpitations, shortness of breath, vomiting or weakness. The pain is aggravated by nothing. Treatments tried: pepcid. The treatment provided moderate relief. Risk factors include male gender. Pertinent negatives for past medical history include no seizures. Prior diagnostic workup includes echocardiogram (two years ago). Gastroesophageal Reflux   He complains of chest pain and heartburn. He reports no abdominal pain, no choking, no coughing, no nausea, no sore throat or no wheezing. This is a recurrent problem. The current episode started more than 1 month ago. The problem occurs occasionally. The problem has been waxing and waning. The heartburn duration is less than a minute. The heartburn is located in the substernum. The heartburn is of mild intensity. The heartburn does not wake him from sleep. The heartburn does not limit his activity. The heartburn doesn't change with position. The symptoms are aggravated by certain foods. Associated symptoms include fatigue. There are no known risk factors. He has tried an antacid for the symptoms. The treatment provided mild relief. Diarrhea    This is a recurrent (IBS with diarrhea) problem. The current episode started more than 1 month ago. The problem has been waxing and waning. The stool consistency is described as watery. Associated symptoms include arthralgias and bloating.  Pertinent negatives include no abdominal pain, chills, coughing, fever, headaches, myalgias or vomiting. The symptoms are aggravated by dairy products. There are no known risk factors. Treatments tried: hyoscyamine  The treatment provided mild relief. His past medical history is significant for irritable bowel syndrome. Hand Pain    Incident onset: left ring finger pain and locking. Incident location: gradual. There was no injury mechanism. Pain location: left finger trigger. The quality of the pain is described as aching. The pain does not radiate. The pain is at a severity of 4/10. The pain is mild. The pain has been worsening since the incident. Associated symptoms include chest pain. Pertinent negatives include no numbness. Nothing aggravates the symptoms. He has tried NSAIDs for the symptoms. The treatment provided no relief. Review of Systems   Constitutional: Positive for fatigue. Negative for activity change, appetite change, chills, diaphoresis, fever and unexpected weight change. HENT: Negative for congestion, dental problem, drooling, ear discharge, ear pain, facial swelling, hearing loss, mouth sores, nosebleeds, postnasal drip, rhinorrhea, sinus pressure, sneezing, sore throat, tinnitus, trouble swallowing and voice change. Eyes: Negative for photophobia, pain, discharge, redness, itching and visual disturbance. Respiratory: Negative for apnea, cough, choking, chest tightness, shortness of breath, wheezing and stridor. Cardiovascular: Positive for chest pain. Negative for palpitations and leg swelling. Gastrointestinal: Positive for bloating, diarrhea and heartburn. Negative for abdominal distention, abdominal pain, anal bleeding, blood in stool, constipation, nausea, rectal pain and vomiting. IBS  GERD   Endocrine: Negative for cold intolerance, heat intolerance, polydipsia, polyphagia and polyuria.    Genitourinary: Negative for decreased urine volume, difficulty urinating, discharge, dysuria, enuresis, flank pain, frequency, genital sores, hematuria, penile pain, penile swelling, scrotal swelling, testicular pain and urgency. Musculoskeletal: Positive for arthralgias. Negative for back pain, gait problem, joint swelling, myalgias, neck pain and neck stiffness. Left trigger finger, ring finger    Skin: Negative for color change, pallor, rash and wound. Allergic/Immunologic: Positive for environmental allergies. Negative for food allergies and immunocompromised state. Neurological: Negative for dizziness, tremors, seizures, syncope, facial asymmetry, speech difficulty, weakness, light-headedness, numbness and headaches. Hematological: Negative for adenopathy. Does not bruise/bleed easily. Psychiatric/Behavioral: Negative for agitation, behavioral problems, confusion, decreased concentration, dysphoric mood, hallucinations, self-injury, sleep disturbance and suicidal ideas. The patient is not nervous/anxious and is not hyperactive.         Past Medical History:   Diagnosis Date    H/O cardiovascular stress test 05/16/2018    lexiscan    Heartburn        Social History     Socioeconomic History    Marital status:      Spouse name: Not on file    Number of children: Not on file    Years of education: Not on file    Highest education level: Not on file   Occupational History    Not on file   Social Needs    Financial resource strain: Not on file    Food insecurity     Worry: Not on file     Inability: Not on file    Transportation needs     Medical: Not on file     Non-medical: Not on file   Tobacco Use    Smoking status: Never Smoker    Smokeless tobacco: Never Used   Substance and Sexual Activity    Alcohol use: Yes     Comment: socailly    Drug use: No    Sexual activity: Not on file   Lifestyle    Physical activity     Days per week: Not on file     Minutes per session: Not on file    Stress: Not on file   Relationships    Social connections     Talks on phone: Not on file     Gets together: Not on file     Attends [employer] service: Not on file     Active member of club or organization: Not on file     Attends meetings of clubs or organizations: Not on file     Relationship status: Not on file    Intimate partner violence     Fear of current or ex partner: Not on file     Emotionally abused: Not on file     Physically abused: Not on file     Forced sexual activity: Not on file   Other Topics Concern    Not on file   Social History Narrative    Not on file       Family History   Problem Relation Age of Onset    COPD Father     Emphysema Father     Coronary Art Dis Maternal Grandfather        Current Outpatient Medications on File Prior to Visit   Medication Sig Dispense Refill    dicyclomine (BENTYL) 10 MG capsule Take 1 capsule by mouth 4 times daily 120 capsule 3    diclofenac sodium (VOLTAREN) 1 % GEL Apply 2 g topically 4 times daily 2 Tube 1    propranolol (INDERAL LA) 60 MG extended release capsule TAKE 1 CAPSULE BY MOUTH DAILY 90 capsule 5    hydroCHLOROthiazide (HYDRODIURIL) 25 MG tablet Take 1 tablet by mouth daily 90 tablet 5    hyoscyamine (LEVSIN) 125 MCG tablet Take 1 tablet by mouth every 4 hours as needed for Cramping or Diarrhea 90 tablet 5    propranolol (INDERAL XL) 120 MG extended release capsule Take 1 capsule by mouth nightly 90 capsule 5    tiZANidine (ZANAFLEX) 4 MG tablet Take 1 tablet by mouth nightly 30 tablet 5    meloxicam (MOBIC) 15 MG tablet Take 1 tablet by mouth daily 30 tablet 0     No current facility-administered medications on file prior to visit. Allergies   Allergen Reactions    Cozaar [Losartan] Other (See Comments)     Dizziness, lightheaded, jittery       I have reviewed his allergies, medications, problem list, medical,social and family history and have updated as needed in the electronic medical record. Objective:     Physical Exam  Constitutional:       General: He is not in acute distress. Appearance: Normal appearance.  He is well-developed and normal weight. He is not diaphoretic. HENT:      Head: Normocephalic and atraumatic. Right Ear: External ear normal.      Left Ear: External ear normal.      Nose: Nose normal.      Mouth/Throat:      Pharynx: No oropharyngeal exudate. Cardiovascular:      Rate and Rhythm: Normal rate and regular rhythm. Pulses: Normal pulses. Heart sounds: Normal heart sounds. No murmur. No friction rub. No gallop. Pulmonary:      Effort: Pulmonary effort is normal. No respiratory distress. Breath sounds: Normal breath sounds. No wheezing or rales. Chest:      Chest wall: No tenderness. Abdominal:      Comments: IBS, diarrhea    Genitourinary:     Comments: Deferred by patient  Musculoskeletal:      Comments: Locking of left ring finger   Skin:     Comments: No rash no lesion   Neurological:      Mental Status: He is alert. Psychiatric:         Mood and Affect: Mood normal.         Behavior: Behavior normal.         Thought Content: Thought content normal.         Judgment: Judgment normal.         Assessment / Plan:   Reba Meyer was seen today for chest pain, finger pain, gastroesophageal reflux, irritable bowel syndrome and diarrhea. Diagnoses and all orders for this visit:    Chest pain, unspecified type  -     Alesia Brar MD, Cardiology, [place]    Finger pain, left  -     XR HAND LEFT (MIN 3 VIEWS); Future    Gastroesophageal reflux disease without esophagitis  -     pantoprazole (PROTONIX) 40 MG tablet; Take 1 tablet by mouth every morning (before breakfast)    Irritable bowel syndrome with diarrhea  -     rifaximin (XIFAXAN) 550 MG tablet; Take 1 tablet by mouth 2 times daily    Discussed case at length. Although chest pain is random and not associated with activity, we need to rule out cardiac cause. Discussed Xifaxan at length. Will use 14 day course. If no improvement, he has upcoming appointment with gastroenterology and colonoscopy.  If chest pain returns or changes in nature, develops anginal type symptoms with activity etc he is to go to ER . He is to have low threshold for going to ER. Reviewed healthmaintenance report. Patient is aware of deficiencies and suggested preventative tests.

## 2020-11-11 ENCOUNTER — TELEPHONE (OUTPATIENT)
Dept: ADMINISTRATIVE | Age: 65
End: 2020-11-11

## 2020-11-11 NOTE — TELEPHONE ENCOUNTER
Pt is requesting a referral to Dr. [place] Murdock.  He tried to schedule an appt and was told he needs a referral.   Dr. Rich Moody had previously referred him to Dr. Rashard Delgado, but he prefers to see a cardiologist in [place] area. Please contact pt with any questions.

## 2020-11-12 ENCOUNTER — TELEPHONE (OUTPATIENT)
Dept: ADMINISTRATIVE | Age: 65
End: 2020-11-12

## 2020-11-12 NOTE — TELEPHONE ENCOUNTER
URGENT NP referral in the workque Per Dr. Frannie Madrigal on: 11/11/20  Dx: CP/palpitations. Scheduling advise/help please.

## 2020-11-12 NOTE — TELEPHONE ENCOUNTER
Patient Appointment Form:      PCP: Danii Rowley   Referring: Danii Rowley    Has the Patient:    Seen a Cardiologist? no    Had a heart catheterization? no    Had heart surgery? no    Had a stress test or nuclear stress test? yes   date: 2018   [facility]    Had an echocardiogram? no    Had a vascular ultrasound? no    Had a 24/48 heart monitor or extended cardiac event monitor? no    Had recent blood work in the last 6 months? yes    date: 09/2020    ordering physician: Danii Rowley    Had a pacemaker/ICD/ILR implant? no    Seen an Electrophysiologist? no        Will send records via: in [address] [place] [address] Rd      Date & time of appointment:  11/13 @ 7 w/JS

## 2020-11-13 ENCOUNTER — OFFICE VISIT (OUTPATIENT)
Dept: CARDIOLOGY CLINIC | Age: 65
End: 2020-11-13
Payer: COMMERCIAL

## 2020-11-13 ENCOUNTER — HOSPITAL ENCOUNTER (OUTPATIENT)
Age: 65
Discharge: HOME | End: 2020-11-13
Payer: COMMERCIAL

## 2020-11-13 VITALS
DIASTOLIC BLOOD PRESSURE: 82 MMHG | HEIGHT: 72 IN | RESPIRATION RATE: 18 BRPM | SYSTOLIC BLOOD PRESSURE: 128 MMHG | WEIGHT: 190.6 LBS | BODY MASS INDEX: 25.81 KG/M2 | HEART RATE: 47 BPM

## 2020-11-13 LAB
ANION GAP SERPL CALCULATED.3IONS-SCNC: 11 MMOL/L (ref 7–16)
BUN BLDV-MCNC: 16 MG/DL (ref 8–23)
CALCIUM SERPL-MCNC: 9.4 MG/DL (ref 8.6–10.2)
CHLORIDE BLD-SCNC: 102 MMOL/L (ref 98–107)
CO2: 28 MMOL/L (ref 22–29)
CREAT SERPL-MCNC: 1 MG/DL (ref 0.7–1.2)
GFR AFRICAN AMERICAN: >60
GFR NON-AFRICAN AMERICAN: >60 ML/MIN/1.73
GLUCOSE BLD-MCNC: 160 MG/DL (ref 74–99)
HCT VFR BLD CALC: 45.4 % (ref 37–54)
HEMOGLOBIN: 15.1 G/DL (ref 12.5–16.5)
INR BLD: 0.9
MCH RBC QN AUTO: 30.9 PG (ref 26–35)
MCHC RBC AUTO-ENTMCNC: 33.3 % (ref 32–34.5)
MCV RBC AUTO: 92.8 FL (ref 80–99.9)
PDW BLD-RTO: 12.9 FL (ref 11.5–15)
PLATELET # BLD: 258 E9/L (ref 130–450)
PMV BLD AUTO: 10.1 FL (ref 7–12)
POTASSIUM SERPL-SCNC: 4.1 MMOL/L (ref 3.5–5)
PROTHROMBIN TIME: 10.7 SEC (ref 9.3–12.4)
RBC # BLD: 4.89 E12/L (ref 3.8–5.8)
SODIUM BLD-SCNC: 141 MMOL/L (ref 132–146)
WBC # BLD: 8.7 E9/L (ref 4.5–11.5)

## 2020-11-13 PROCEDURE — 99245 OFF/OP CONSLTJ NEW/EST HI 55: CPT | Performed by: INTERNAL MEDICINE

## 2020-11-13 PROCEDURE — 85027 COMPLETE CBC AUTOMATED: CPT

## 2020-11-13 PROCEDURE — 85610 PROTHROMBIN TIME: CPT

## 2020-11-13 PROCEDURE — 93000 ELECTROCARDIOGRAM COMPLETE: CPT | Performed by: INTERNAL MEDICINE

## 2020-11-13 PROCEDURE — 36415 COLL VENOUS BLD VENIPUNCTURE: CPT

## 2020-11-13 PROCEDURE — 80048 BASIC METABOLIC PNL TOTAL CA: CPT

## 2020-11-13 RX ORDER — BLOOD-GLUCOSE METER
500 EACH MISCELLANEOUS DAILY
Status: ON HOLD | COMMUNITY
End: 2020-11-17 | Stop reason: HOSPADM

## 2020-11-13 RX ORDER — AMPICILLIN TRIHYDRATE 250 MG
500 CAPSULE ORAL DAILY
Status: ON HOLD | COMMUNITY
End: 2022-08-04 | Stop reason: HOSPADM

## 2020-11-13 RX ORDER — ASPIRIN 81 MG/1
81 TABLET ORAL DAILY
COMMUNITY

## 2020-11-13 RX ORDER — DIMENHYDRINATE 50 MG
1000 TABLET ORAL DAILY
Status: ON HOLD | COMMUNITY
End: 2022-08-04 | Stop reason: HOSPADM

## 2020-11-13 NOTE — PROGRESS NOTES
OUTPATIENT CARDIOLOGY CONSULT    Name: Cheyenne Grimes    Age: 63 y.o. Date of Service: 11/13/2020    Reason for Consultation: Exertional chest pain    Referring Physician: Bethel Snellen, DO    History of Present Illness:  Cheyenne Grmies is a 63 y.o. male who presents today for further evaluation of exertional chest pain. His PMH is outlined in detail below (see A/P below). Prior to 10/2020, he was routinely active with no complaints of chest pain, SOB, palpitations, lightheadedness, dizziness, or syncope. No history of PND or orthopnea. Over the past ~ 1 month, he's experienced frequent exertional chest pain -- now occurring more frequently/\"almost every time\", mid-sternal, no radiation of the pain, \"feels like severe heartburn. ..fullness\" (no improvement on PPI), associated mild SOB, episodes last > 10 minutes, pain subsides with rest. He is currently with no active cardiac complaints at rest. SB on EKG. He is a . His son Jeramy Thrasher is a cardiothoracic surgery fellow at [employer] and his daughter is a nurse at [employer].     Review of Systems:  Cardiac: As per HPI  General: No fever, chills  Pulmonary: As per HPI  HEENT: No visual disturbances, difficult swallowing  GI: No nausea, vomiting  : No dysuria, hematuria  Endocrine: No thyroid disease or DM  Musculoskeletal: BARRERA x 4, no focal motor deficits  Skin: Intact, no rashes  Neuro: No headache, seizures  Psych: Currently with no depression, anxiety    Past Medical History:  Past Medical History:   Diagnosis Date    H/O cardiovascular stress test 05/16/2018    lexiscan    Heartburn        Past Surgical History:  Past Surgical History:   Procedure Laterality Date    HERNIA REPAIR         Family History:  Family History   Problem Relation Age of Onset    COPD Father     Emphysema Father     Coronary Art Dis Maternal Grandfather        Social History:  Social History     Socioeconomic History    Marital status:      Spouse name: Not on file    Number of children: Not on file    Years of education: Not on file    Highest education level: Not on file   Occupational History    Not on file   Social Needs    Financial resource strain: Not on file    Food insecurity     Worry: Not on file     Inability: Not on file    Transportation needs     Medical: Not on file     Non-medical: Not on file   Tobacco Use    Smoking status: Never Smoker    Smokeless tobacco: Never Used   Substance and Sexual Activity    Alcohol use: Yes     Comment: socailly    Drug use: No    Sexual activity: Not on file   Lifestyle    Physical activity     Days per week: Not on file     Minutes per session: Not on file    Stress: Not on file   Relationships    Social connections     Talks on phone: Not on file     Gets together: Not on file     Attends [employer] service: Not on file     Active member of club or organization: Not on file     Attends meetings of clubs or organizations: Not on file     Relationship status: Not on file    Intimate partner violence     Fear of current or ex partner: Not on file     Emotionally abused: Not on file     Physically abused: Not on file     Forced sexual activity: Not on file   Other Topics Concern    Not on file   Social History Narrative    Not on file       Allergies:   Allergies   Allergen Reactions    Cozaar [Losartan] Other (See Comments)     Dizziness, lightheaded, jittery       Current Medications:  Current Outpatient Medications   Medication Sig Dispense Refill    aspirin 81 MG EC tablet Take 81 mg by mouth daily      Red Yeast Rice 500 MG/0.5GM POWD Take 500 mg by mouth daily      Flaxseed, Linseed, (FLAX SEED OIL) 1000 MG CAPS Take 1,000 mg by mouth daily      Cinnamon 500 MG CAPS Take 500 mg by mouth daily      rifaximin (XIFAXAN) 550 MG tablet Take 1 tablet by mouth 2 times daily 42 tablet 0    pantoprazole (PROTONIX) 40 MG tablet Take 1 tablet by mouth every morning (before breakfast) 30 tablet 5    propranolol (INDERAL LA) 60 MG extended release capsule TAKE 1 CAPSULE BY MOUTH DAILY 90 capsule 5    hydroCHLOROthiazide (HYDRODIURIL) 25 MG tablet Take 1 tablet by mouth daily 90 tablet 5    propranolol (INDERAL XL) 120 MG extended release capsule Take 1 capsule by mouth nightly 90 capsule 5    dicyclomine (BENTYL) 10 MG capsule Take 1 capsule by mouth 4 times daily (Patient not taking: Reported on 11/13/2020) 120 capsule 3    diclofenac sodium (VOLTAREN) 1 % GEL Apply 2 g topically 4 times daily (Patient not taking: Reported on 11/13/2020) 2 Tube 1    hyoscyamine (LEVSIN) 125 MCG tablet Take 1 tablet by mouth every 4 hours as needed for Cramping or Diarrhea (Patient not taking: Reported on 11/13/2020) 90 tablet 5    tiZANidine (ZANAFLEX) 4 MG tablet Take 1 tablet by mouth nightly (Patient not taking: Reported on 11/13/2020) 30 tablet 5    meloxicam (MOBIC) 15 MG tablet Take 1 tablet by mouth daily (Patient not taking: Reported on 11/13/2020) 30 tablet 0     No current facility-administered medications for this visit. Physical Exam:  /82   Pulse (!) 47   Resp 18   Ht 6' (1.829 m)   Wt 190 lb 9.6 oz (86.5 kg)   BMI 25.85 kg/m²   Wt Readings from Last 3 Encounters:   11/13/20 190 lb 9.6 oz (86.5 kg)   11/02/20 187 lb (84.8 kg)   05/14/20 187 lb (84.8 kg)     Appearance: Awake, alert, no acute respiratory distress  Skin: Intact, no rash  Head: Normocephalic, atraumatic  Eyes: EOMI, no conjunctival erythema  ENMT: No pharyngeal erythema, MMM, no rhinorrhea  Neck: Supple, no elevated JVP, no carotid bruits  Lungs: Clear to auscultation bilaterally. No wheezes, rales, or rhonchi.   Cardiac: Regular rate and rhythm, +S1S2, no murmurs apparent  Abdomen: Soft, nontender, +bowel sounds  Extremities: Moves all extremities x 4, no lower extremity edema  Neurologic: No focal motor deficits apparent, normal mood and affect  Peripheral Pulses: Intact posterior tibial pulses bilaterally    Laboratory Tests:  Lab Results   Component Value Date    CREATININE 1.0 08/04/2020    BUN 13 08/04/2020     08/04/2020    K 4.5 08/04/2020    CL 99 08/04/2020    CO2 22 08/04/2020     No results found for: MG  Lab Results   Component Value Date    WBC 6.5 08/04/2020    HGB 15.6 08/04/2020    HCT 46.6 08/04/2020    MCV 91.9 08/04/2020     08/04/2020     Lab Results   Component Value Date    ALT 41 (H) 08/04/2020    AST 39 08/04/2020    ALKPHOS 94 08/04/2020    BILITOT 0.4 08/04/2020     No results found for: CKTOTAL, CKMB, CKMBINDEX, TROPONINI  No results found for: INR, PROTIME  Lab Results   Component Value Date    TSH 2.070 08/04/2020     Lab Results   Component Value Date    LABA1C 6.2 (H) 08/04/2020     No results found for: EAG  Lab Results   Component Value Date    CHOL 199 08/04/2020    CHOL 296 (H) 10/25/2016     Lab Results   Component Value Date    TRIG 177 (H) 08/04/2020    TRIG 196 (H) 10/25/2016     Lab Results   Component Value Date    HDL 39 08/04/2020    HDL 44 10/25/2016     Lab Results   Component Value Date    LDLCALC 125 (H) 08/04/2020    LDLCALC 213 (H) 10/25/2016     Lab Results   Component Value Date    LABVLDL 35 08/04/2020    LABVLDL 39 10/25/2016     No results found for: CHOLHDLRATIO  No results for input(s): PROBNP in the last 72 hours. Cardiac Tests:  EKG reviewed (EKG date: 11/13/2020): SB, rate 47, NSSTT changes    Lexiscan nuclear stress test reviewed: 5/16/18  1. Pharmacologic stress myocardial perfusion imaging within normal    limits. 2. Stress induced ischemia is not demonstrated. Left    ventricular chamber size is normal.  Left ventricular myocardium    demonstrates normal contractility at stress with expected thickening    of the left ventricular myocardium during systole. LVEF equals 53%. ASSESSMENT / PLAN:  1. Exertional chest pain (see HPI)  2. HTN -- controlled  3. HLD  4. Hgb A1c 6.2  5. GERD -- on PPI  6. IBS  7.  Sinus bradycardia -- on BB    - I had a lengthy discussion with Mr. Cara Keita about undergoing a cardiac catheterization +/- PCI (and the possibility of needing a surgical evaluation for CABG). The indication, risks, benefits, and alternatives were discussed with him, and he agrees to proceed with the cardiac catheterization on 11/16/2020. Indication: 10, Score: 7.  - Continue current medications (includng ASA and BB)  - Aggressive risk factor modifications    Thank you for allowing me to participate in your patient's care. Please feel free to contact me if you have any questions or concerns.     Ambika Marie MD  [facility]) Cardiology

## 2020-11-16 ENCOUNTER — HOSPITAL ENCOUNTER (OUTPATIENT)
Dept: CARDIAC CATH/INVASIVE PROCEDURES | Age: 65
Setting detail: OBSERVATION
Discharge: HOME | End: 2020-11-17
Attending: INTERNAL MEDICINE | Admitting: INTERNAL MEDICINE
Payer: COMMERCIAL

## 2020-11-16 PROBLEM — I25.10 CAD IN NATIVE ARTERY: Status: ACTIVE | Noted: 2020-11-16

## 2020-11-16 LAB
ABO/RH: NORMAL
ANTIBODY SCREEN: NORMAL
POC ACT LR: 210 SECONDS
POC ACT LR: 260 SECONDS
POC ACT LR: >400 SECONDS

## 2020-11-16 PROCEDURE — C1725 CATH, TRANSLUMIN NON-LASER: HCPCS

## 2020-11-16 PROCEDURE — C1769 GUIDE WIRE: HCPCS

## 2020-11-16 PROCEDURE — 93458 L HRT ARTERY/VENTRICLE ANGIO: CPT | Performed by: INTERNAL MEDICINE

## 2020-11-16 PROCEDURE — 6370000000 HC RX 637 (ALT 250 FOR IP): Performed by: INTERNAL MEDICINE

## 2020-11-16 PROCEDURE — G0379 DIRECT REFER HOSPITAL OBSERV: HCPCS

## 2020-11-16 PROCEDURE — G0378 HOSPITAL OBSERVATION PER HR: HCPCS

## 2020-11-16 PROCEDURE — C1874 STENT, COATED/COV W/DEL SYS: HCPCS

## 2020-11-16 PROCEDURE — 86901 BLOOD TYPING SEROLOGIC RH(D): CPT

## 2020-11-16 PROCEDURE — C1894 INTRO/SHEATH, NON-LASER: HCPCS

## 2020-11-16 PROCEDURE — 85347 COAGULATION TIME ACTIVATED: CPT

## 2020-11-16 PROCEDURE — 86850 RBC ANTIBODY SCREEN: CPT

## 2020-11-16 PROCEDURE — 6360000002 HC RX W HCPCS

## 2020-11-16 PROCEDURE — 36415 COLL VENOUS BLD VENIPUNCTURE: CPT

## 2020-11-16 PROCEDURE — C1887 CATHETER, GUIDING: HCPCS

## 2020-11-16 PROCEDURE — 2580000003 HC RX 258: Performed by: INTERNAL MEDICINE

## 2020-11-16 PROCEDURE — 86900 BLOOD TYPING SEROLOGIC ABO: CPT

## 2020-11-16 PROCEDURE — 92928 PRQ TCAT PLMT NTRAC ST 1 LES: CPT | Performed by: INTERNAL MEDICINE

## 2020-11-16 PROCEDURE — 2500000003 HC RX 250 WO HCPCS

## 2020-11-16 PROCEDURE — 93005 ELECTROCARDIOGRAM TRACING: CPT | Performed by: INTERNAL MEDICINE

## 2020-11-16 PROCEDURE — 2709999900 HC NON-CHARGEABLE SUPPLY

## 2020-11-16 PROCEDURE — 92929 HC PRQ CARD STENT W/ANGIO ADDL: CPT | Performed by: INTERNAL MEDICINE

## 2020-11-16 PROCEDURE — 6370000000 HC RX 637 (ALT 250 FOR IP)

## 2020-11-16 RX ORDER — METOPROLOL TARTRATE 50 MG/1
50 TABLET, FILM COATED ORAL 2 TIMES DAILY
Status: DISCONTINUED | OUTPATIENT
Start: 2020-11-16 | End: 2020-11-17 | Stop reason: HOSPADM

## 2020-11-16 RX ORDER — ASPIRIN 81 MG/1
81 TABLET, CHEWABLE ORAL DAILY
Status: DISCONTINUED | OUTPATIENT
Start: 2020-11-17 | End: 2020-11-17 | Stop reason: HOSPADM

## 2020-11-16 RX ORDER — ALPRAZOLAM 0.25 MG/1
0.5 TABLET ORAL 4 TIMES DAILY PRN
Status: DISCONTINUED | OUTPATIENT
Start: 2020-11-16 | End: 2020-11-17 | Stop reason: HOSPADM

## 2020-11-16 RX ORDER — ATORVASTATIN CALCIUM 40 MG/1
40 TABLET, FILM COATED ORAL NIGHTLY
Status: DISCONTINUED | OUTPATIENT
Start: 2020-11-16 | End: 2020-11-17 | Stop reason: HOSPADM

## 2020-11-16 RX ORDER — PANTOPRAZOLE SODIUM 40 MG/1
40 TABLET, DELAYED RELEASE ORAL
Status: DISCONTINUED | OUTPATIENT
Start: 2020-11-17 | End: 2020-11-17 | Stop reason: HOSPADM

## 2020-11-16 RX ORDER — SODIUM CHLORIDE 9 MG/ML
INJECTION, SOLUTION INTRAVENOUS CONTINUOUS
Status: DISCONTINUED | OUTPATIENT
Start: 2020-11-16 | End: 2020-11-17 | Stop reason: HOSPADM

## 2020-11-16 RX ORDER — ACETAMINOPHEN 325 MG/1
650 TABLET ORAL EVERY 4 HOURS PRN
Status: DISCONTINUED | OUTPATIENT
Start: 2020-11-16 | End: 2020-11-17 | Stop reason: HOSPADM

## 2020-11-16 RX ADMIN — SODIUM CHLORIDE: 9 INJECTION, SOLUTION INTRAVENOUS at 16:38

## 2020-11-16 RX ADMIN — TICAGRELOR 90 MG: 90 TABLET ORAL at 21:13

## 2020-11-16 RX ADMIN — METOPROLOL TARTRATE 50 MG: 50 TABLET, FILM COATED ORAL at 21:13

## 2020-11-16 RX ADMIN — ATORVASTATIN CALCIUM 40 MG: 40 TABLET, FILM COATED ORAL at 21:13

## 2020-11-16 ASSESSMENT — PAIN SCALES - GENERAL
PAINLEVEL_OUTOF10: 0
PAINLEVEL_OUTOF10: 0

## 2020-11-16 NOTE — PROCEDURES
[address]                  [address] [facility],  [facility]                            CARDIAC CATHETERIZATION    PATIENT NAME: Doyle Heard                        :        1955  MED REC NO:   [id number]                            ROOM:  ACCOUNT NO:   [de-identified]                           ADMIT DATE: 2020  PROVIDER:     Nas Capone MD    DATE OF PROCEDURE:  2020    PROCEDURES:  1. Coronary angiography. 2.  Left ventriculography. 3.  Percutaneous coronary intervention with deployment of 2.0 x 12 mm  [place] Resolute drug-eluting stent in the right PLV, deployment of 2.25 x  22 mm [place] Resolute drug-eluting stent in distal RCA into proximal right  PLV. 4.  Deployment of 3.0 x 18 mm [place] Resolute drug-eluting stent in mid  RCA. 5.  Conscious sedation using Versed and fentanyl. Indication #10, score of 7. Indication for PCI is 15 and 7. INDICATIONS FOR PROCEDURE:  The patient is a 74-year-old male without  any significant past medical history, who has been having typical  anginal chest pain, had negative Lexiscan stress test; however, the  patient continued to have significant symptoms. So, the patient was  brought to the cath lab for further evaluation to rule out  false-negative Lexiscan stress test.    DESCRIPTION OF PROCEDURE:  After the appropriate informed consent, the  right wrist area was prepped and draped in the usual sterile fashion. A  timeout was called. The right wrist area was locally anesthetized with  2 mL of 2% lidocaine. Alyne Economy test was normal.  A 21-gauge needle was  used to access the right radial artery. A 6-Mosotho introducer sheath  was used to cannulate the right radial artery. A 6-[place] JL-3.5 and  6-[place] JR-4 catheters were used for coronary angiography in multiple  projections. ANGIOGRAPHIC FINDINGS:  The left main coronary artery is a mid-sized vessel without any  significant disease.   It has mild luminal irregularities. It bifurcates  into left anterior descending artery and left circumflex artery. The left anterior descending artery extends down to the apex. It is a  long vessel with luminal irregularities involving most of the vessel. There is 50% to 60% lesion involving the mid segment of the vessel. The  LAD gives off a first mid-sized diagonal branch and few small ones. The  ramus intermedius artery is a mid-sized vessel without any significant  disease. The left circumflex artery is a mid-sized vessel that gives  off two OM branches. The left circumflex artery and its branches have  no significant disease. There was grade 3 left-to-right collaterals  noted from the left coronary system into the right PDA/PLV. The right coronary artery arises normally from the right sinus of  Valsalva. It is a large vessel, dominant circulation that has subtotal  occlusion in the mid segment. There was also two sequential lesions  involving the distal RCA. After completing the coronary angiography, we proceeded with a  percutaneous coronary intervention. We used 6-[place] JR-4 guide  catheter to engage the vessel. We used the floppy wire to cross the  lesions. The lesions were predilated using 2.0 x 12 mm balloon. Then,  the distal lesions were stented using 2.25 x 22 mm [place] Resolute  drug-eluting stent. However, follow up angiography showed that the  distal lesion was not covered with the stent. So, we used 2.0 x 12 mm  stent extending into the inferior branch of the right PLV. Followup  angiography showed 0% residual stenosis and MAYTE-3 flow distally. Then,  we used a 3.0 x 18 mm [place] Resolute drug-eluting stent for the mid  segment lesion and the lesion was predilated with proximal optimization  using a 3.25 x 12 mm balloon. Followup angiography shows 0% residual  stenosis and MAYTE-3 flow distally.   Then, we used a pigtail catheter for  left heart catheterization and left ventriculography. LVEDP was 14 and  there was no gradient across the aortic valve on pullback. Left  ventriculography showed estimated ejection fraction of 55% and there was  no gradient across the aortic valve on pullback. At the end of the  procedure, the catheter and the wire were pulled out from the body, the  sheath was pulled out from the body and a Vasc Band was applied to the  right wrist area with effective hemostasis. COMPLICATIONS:  None. ESTIMATED TOTAL BLOOD LOSS:  40 to 50 mL. MEDICATIONS USED DURING THE PROCEDURE:  We used intraarterial verapamil  to prevent vasospasm. We used intraarterial heparin for anticoagulation  for the diagnostic approach. Then, we used intravenous heparin for  anticoagulation for the interventional approach with therapeutic ACT  throughout the procedure. The patient also was given 180 mg of Brilinta  prior to stenting. CONSCIOUS SEDATION:  We used Versed and fentanyl for conscious sedation. First dose was given at 09:33, procedure ended at 11 o'clock. There was  87 minutes of direct face-to-face supervision during conscious sedation  administration. Total contrast used was 275 mL of Optiray. Total fluoroscopy time was 26.2 minutes. IMPRESSION:  1. Subtotal occlusion of the mid RCA with successful deployment of 3.0  x 18 mm [place] Resolute drug-eluting stent (pre-PCI MAYTE-2 flow, post-PCI  MAYTE-3 flow with 0% residual stenosis). 2.  Significant disease involving the distal RCA to the right PDA with  successful deployment of 2.25 x 22 mm and 2.0 x 12 mm [place] Resolute  drug-eluting stents in overlapping fashion (pre-PCI MAYTE-2 flow,  post-PCI MAYTE-3 flow with 10% residual stenosis). 3.  50% to 60% stenosis involving the mid LAD with luminal  irregularities involving the rest of the vessel. 4.  Significant disease involving the ramus intermedius artery and left  circumflex artery. 5.  Preserved LV function. RECOMMENDATIONS:  1.   Aspirin for life.  2.  P2Y12 inhibitors for at least a year, preferably longer. 3.  Aggressive coronary artery disease risk factor modification. 4.  The patient will be admitted to the hospital for overnight  observation and IV hydration.         Eliecer Whyte MD    D: 11/16/2020 11:24:34       T: 11/16/2020 12:58:12     [email or web address]  Job#: [id number]     Doc#: [id number]    CC:  Hugo Granger MD

## 2020-11-17 VITALS
SYSTOLIC BLOOD PRESSURE: 110 MMHG | HEART RATE: 56 BPM | RESPIRATION RATE: 16 BRPM | HEIGHT: 72 IN | WEIGHT: 180 LBS | BODY MASS INDEX: 24.38 KG/M2 | DIASTOLIC BLOOD PRESSURE: 69 MMHG | OXYGEN SATURATION: 96 % | TEMPERATURE: 98.6 F

## 2020-11-17 LAB
ANION GAP SERPL CALCULATED.3IONS-SCNC: 12 MMOL/L (ref 7–16)
BUN BLDV-MCNC: 17 MG/DL (ref 8–23)
CALCIUM SERPL-MCNC: 9.1 MG/DL (ref 8.6–10.2)
CHLORIDE BLD-SCNC: 99 MMOL/L (ref 98–107)
CO2: 24 MMOL/L (ref 22–29)
CREAT SERPL-MCNC: 1 MG/DL (ref 0.7–1.2)
EKG ATRIAL RATE: 57 BPM
EKG P AXIS: -11 DEGREES
EKG P-R INTERVAL: 144 MS
EKG Q-T INTERVAL: 402 MS
EKG QRS DURATION: 96 MS
EKG QTC CALCULATION (BAZETT): 391 MS
EKG R AXIS: -20 DEGREES
EKG T AXIS: -14 DEGREES
EKG VENTRICULAR RATE: 57 BPM
GFR AFRICAN AMERICAN: >60
GFR NON-AFRICAN AMERICAN: >60 ML/MIN/1.73
GLUCOSE BLD-MCNC: 125 MG/DL (ref 74–99)
HCT VFR BLD CALC: 43.7 % (ref 37–54)
HEMOGLOBIN: 15.2 G/DL (ref 12.5–16.5)
MCH RBC QN AUTO: 30.8 PG (ref 26–35)
MCHC RBC AUTO-ENTMCNC: 34.8 % (ref 32–34.5)
MCV RBC AUTO: 88.6 FL (ref 80–99.9)
PDW BLD-RTO: 12.9 FL (ref 11.5–15)
PLATELET # BLD: 209 E9/L (ref 130–450)
PMV BLD AUTO: 10.8 FL (ref 7–12)
POTASSIUM SERPL-SCNC: 4 MMOL/L (ref 3.5–5)
RBC # BLD: 4.93 E12/L (ref 3.8–5.8)
SODIUM BLD-SCNC: 135 MMOL/L (ref 132–146)
WBC # BLD: 8.3 E9/L (ref 4.5–11.5)

## 2020-11-17 PROCEDURE — 80048 BASIC METABOLIC PNL TOTAL CA: CPT

## 2020-11-17 PROCEDURE — 36415 COLL VENOUS BLD VENIPUNCTURE: CPT

## 2020-11-17 PROCEDURE — 93010 ELECTROCARDIOGRAM REPORT: CPT | Performed by: INTERNAL MEDICINE

## 2020-11-17 PROCEDURE — 99217 PR OBSERVATION CARE DISCHARGE MANAGEMENT: CPT | Performed by: INTERNAL MEDICINE

## 2020-11-17 PROCEDURE — 85027 COMPLETE CBC AUTOMATED: CPT

## 2020-11-17 PROCEDURE — 6370000000 HC RX 637 (ALT 250 FOR IP): Performed by: INTERNAL MEDICINE

## 2020-11-17 PROCEDURE — G0378 HOSPITAL OBSERVATION PER HR: HCPCS

## 2020-11-17 RX ORDER — ATORVASTATIN CALCIUM 40 MG/1
40 TABLET, FILM COATED ORAL NIGHTLY
Qty: 90 TABLET | Refills: 3 | Status: SHIPPED
Start: 2020-11-17 | End: 2021-02-15 | Stop reason: DRUGHIGH

## 2020-11-17 RX ORDER — METOPROLOL TARTRATE 50 MG/1
50 TABLET, FILM COATED ORAL 2 TIMES DAILY
Qty: 180 TABLET | Refills: 3 | Status: SHIPPED
Start: 2020-11-17 | End: 2020-11-30 | Stop reason: DRUGHIGH

## 2020-11-17 RX ORDER — CLOPIDOGREL BISULFATE 75 MG/1
75 TABLET ORAL DAILY
Qty: 30 TABLET | Refills: 3 | Status: SHIPPED | OUTPATIENT
Start: 2020-11-17 | End: 2021-01-13 | Stop reason: SDUPTHER

## 2020-11-17 RX ORDER — CLOPIDOGREL BISULFATE 75 MG/1
300 TABLET ORAL ONCE
Status: COMPLETED | OUTPATIENT
Start: 2020-11-17 | End: 2020-11-17

## 2020-11-17 RX ADMIN — ASPIRIN 81 MG: 81 TABLET, CHEWABLE ORAL at 10:39

## 2020-11-17 RX ADMIN — CLOPIDOGREL BISULFATE 300 MG: 75 TABLET ORAL at 13:09

## 2020-11-17 RX ADMIN — PANTOPRAZOLE SODIUM 40 MG: 40 TABLET, DELAYED RELEASE ORAL at 10:39

## 2020-11-17 RX ADMIN — TICAGRELOR 90 MG: 90 TABLET ORAL at 10:39

## 2020-11-17 RX ADMIN — METOPROLOL TARTRATE 50 MG: 50 TABLET, FILM COATED ORAL at 10:39

## 2020-11-17 ASSESSMENT — PAIN SCALES - GENERAL
PAINLEVEL_OUTOF10: 0
PAINLEVEL_OUTOF10: 0

## 2020-11-17 NOTE — CONSULTS
Met with patient and discussed that their physician has ordered a referral to our outpatient Phase II Cardiac Rehabilitation program. Reviewed the benefits of cardiac rehabilitation based on their diagnosis and personal risk factors. Patient demonstrates moderate interest in Cardiac Rehabilitation at this time. Cardiac Rehabilitation brochure provided to patient/family. The Cardiac Rehabilitation Program has been provided the patient's referral information and pertinent patient details and history. The patient may call [address] [place] [address] at [phone] for additional information or questions. Contact information for [address] [place] [address] and other choices close to the patient's residence have been provided in the discharge instructions so that the patient may call and schedule an appointment when cleared by their physician.  Thank you for the referral.

## 2020-11-17 NOTE — CARE COORDINATION
Transition of care  The patient came in  As observation for exertional chest pain  with light headedness. Cardiology consulted Dr Lilia Kraft and he went for a cardiac cath with a drug eluting stent. PCI 15 and 7. Found subtotal occulsion of the mid RCA with successful stent.(pre-PCI MAYTE-2 flow. post-PCI MAYTE-3 flow with 10% residual stenosis). 50% to 60% stenosis involving the mid LAD with luminal  irregularities involving the rest of the vessel. Plan asa for life ,P2Y12 inhibitors for at least a year. The patient was already discharged before I was able to see him.

## 2020-11-17 NOTE — DISCHARGE SUMMARY
Physician Discharge Summary     Patient ID:  Dolores Briceno  [id number]  86 y.o.  1955    Admit date: 11/16/2020    Discharge date and time: 11/17/2020  4:00 PM     Admitting Physician: Silver Dunn MD     Discharge Physician: Birgit Irwin    Admission Diagnoses: Chest pain, unspecified [R07.9]  CAD in native artery [I25.10]  CAD in native artery [I25.10]    Discharge Diagnoses: CAD s/p PCI  To RCA    Admission Condition: fair    Discharged Condition: good    Indication for Admission: CAD, PCI to [address] Course: He did well postprocedure, on the day of discharge he was feeling okay, complaining of new onset of resting shortness of breath, especially after Taking Brilinta    Consults: none    Significant Diagnostic Studies: angiography: IMPRESSION:  1. Subtotal occlusion of the mid RCA with successful deployment of 3.0  x 18 mm [place] Resolute drug-eluting stent (pre-PCI MAYTE-2 flow, post-PCI  MAYTE-3 flow with 0% residual stenosis). 2.  Significant disease involving the distal RCA to the right PDA with  successful deployment of 2.25 x 22 mm and 2.0 x 12 mm [place] Resolute  drug-eluting stents in overlapping fashion (pre-PCI MAYTE-2 flow,  post-PCI MAYTE-3 flow with 10% residual stenosis). 3.  50% to 60% stenosis involving the mid LAD with luminal  irregularities involving the rest of the vessel. 4.  Significant disease involving the ramus intermedius artery and left  circumflex artery. 5.  Preserved LV function.     RECOMMENDATIONS:  1. Aspirin for life. 2.  P2Y12 inhibitors for at least a year, preferably longer. 3.  Aggressive coronary artery disease risk factor modification. 4.  The patient will be admitted to the hospital for overnight  observation and IV hydration. Outstanding Order Results     No orders found for last 30 day(s).           Discharge Exam:  /69   Pulse 56   Temp 98.6 °F (37 °C) (Temporal)   Resp 16   Ht 6' (1.829 m)   Wt 180 lb (81.6 kg)   SpO2 96%   BMI 24.41 kg/m² Disposition: home    Patient Instructions:    Corinne Noun   Home Medication Instructions OYJ:[id number]    Printed on:11/17/20 9429   Medication Information                      aspirin 81 MG EC tablet  Take 81 mg by mouth daily             atorvastatin (LIPITOR) 40 MG tablet  Take 1 tablet by mouth nightly             Cinnamon 500 MG CAPS  Take 500 mg by mouth daily             clopidogrel (PLAVIX) 75 MG tablet  Take 1 tablet by mouth daily             Flaxseed, Linseed, (FLAX SEED OIL) 1000 MG CAPS  Take 1,000 mg by mouth daily             metoprolol tartrate (LOPRESSOR) 50 MG tablet  Take 1 tablet by mouth 2 times daily             pantoprazole (PROTONIX) 40 MG tablet  Take 1 tablet by mouth every morning (before breakfast)                 Activity: activity as tolerated  Diet: cardiac diet  Wound Care: none needed    will change Brilinta to Plavix due to Brilinta due to shortness of breath    Risk of instent thrombosis due to premature discontinuation of either ASA or Plavix discussed with patient at length    Follow-up with  in 1-2 weeks, follow up with  in 3-4 weeks    Note that 32 minutes was spent in preparing discharge papers, discussing discharge with patient, medication review    Signed:  Addie Iverson  11/17/2020  5:14 PM

## 2020-11-30 ENCOUNTER — TELEPHONE (OUTPATIENT)
Dept: CARDIOLOGY CLINIC | Age: 65
End: 2020-11-30

## 2020-11-30 NOTE — TELEPHONE ENCOUNTER
Patient notified of Dr. Merlin Notice recommendation. Patient NOW tells me that occasionally he can occasionally  \"feel his heart in his chest, like a fullness\". Please advise.

## 2020-11-30 NOTE — TELEPHONE ENCOUNTER
Patient called stating he was having a foggy feeling and feeling jittery after taking Metoprolol 50 mg BID. He decreased to 25 mg BID and symptoms have mostly resolved but his son who is a cardiologist advised maybe there is a better medication for him. Please advise.

## 2020-11-30 NOTE — TELEPHONE ENCOUNTER
Patient notified of Dr. Adamaris Christensen recommendation. Patient now states he isn't sure the Metoprolol is working. He states his BP is elevated sporadically. It can run 150/88-90  (upper 50s) at rest.  When he was walking yesterday he felt that it was up but he did not have a way to check it. Please advise.

## 2020-11-30 NOTE — TELEPHONE ENCOUNTER
Patient notified of Dr. Jarrett Buckley recommendations. Chart amended to reflect dose adjustment of Lopressor. Norvasc e-scribed to pharmacy.

## 2020-12-01 RX ORDER — AMLODIPINE BESYLATE 5 MG/1
5 TABLET ORAL DAILY
Qty: 30 TABLET | Refills: 11 | Status: SHIPPED
Start: 2020-12-01 | End: 2020-12-28 | Stop reason: SDUPTHER

## 2020-12-04 ENCOUNTER — TELEPHONE (OUTPATIENT)
Dept: CARDIOLOGY CLINIC | Age: 65
End: 2020-12-04

## 2020-12-04 NOTE — TELEPHONE ENCOUNTER
Patient called stating he is still having issues. He is feeling occasional squeezing in his chest almost like before he had his stents put in but not as bad. Symptoms can happen at rest and with exertion, though he does not exert himself very much. Denies any shortness of breath. Please advise.

## 2020-12-04 NOTE — TELEPHONE ENCOUNTER
Per verbal from Dr. Dinesh Donaldson:    1)  Imdur 30 mg daily  2)  Call the office next week with status    Patient notified of Dr. Valentine Howard recommendations. Patient states he has researched Imdur and does not want to take it. He does not want to keep going back and forth on the phone, he wants to see Dr. Dinesh Donaldson.  Per verbal from Dr. Dinesh Donaldson, he will see patient 12/9/20 at 7:15 a.m. Patient should proceed to ER with any recurrence in symptoms. Patient notified of Dr. Valentine Howard recommendation and appt date/time.

## 2020-12-09 ENCOUNTER — TELEPHONE (OUTPATIENT)
Dept: CARDIOLOGY | Age: 65
End: 2020-12-09

## 2020-12-09 ENCOUNTER — OFFICE VISIT (OUTPATIENT)
Dept: CARDIOLOGY CLINIC | Age: 65
End: 2020-12-09
Payer: COMMERCIAL

## 2020-12-09 VITALS
HEART RATE: 52 BPM | WEIGHT: 188.4 LBS | RESPIRATION RATE: 16 BRPM | SYSTOLIC BLOOD PRESSURE: 122 MMHG | HEIGHT: 72 IN | BODY MASS INDEX: 25.52 KG/M2 | DIASTOLIC BLOOD PRESSURE: 80 MMHG

## 2020-12-09 PROCEDURE — 93000 ELECTROCARDIOGRAM COMPLETE: CPT | Performed by: INTERNAL MEDICINE

## 2020-12-09 PROCEDURE — 99214 OFFICE O/P EST MOD 30 MIN: CPT | Performed by: INTERNAL MEDICINE

## 2020-12-09 SDOH — HEALTH STABILITY: MENTAL HEALTH: HOW OFTEN DO YOU HAVE A DRINK CONTAINING ALCOHOL?: NEVER

## 2020-12-09 NOTE — PROGRESS NOTES
OUTPATIENT CARDIOLOGY FOLLOW-UP    Name: Dolores Briceno    Age: 63 y.o. Date of Service: 12/9/2020    Chief Complaint: Exertional chest pain, CAD    Referring Physician: Anjelica Adames DO    History of Present Illness:  Dolores Briceno is a 63 y.o. male who presents today for follow-up evaluation of exertional chest pain. His PMH is outlined in detail below (see A/P below). Prior to 10/2020, he was routinely active with no complaints of chest pain, SOB, palpitations, lightheadedness, dizziness, or syncope. No history of PND or orthopnea. At the time of his 11/13/2020 office visit, he reported a ~ 1 month history of frequent exertional chest pain -- occurring more frequently/\"almost every time\", mid-sternal, no radiation of the pain, \"feels like severe heartburn. ..fullness\" (no improvement on PPI), associated mild SOB, episodes last > 10 minutes, pain subsides with rest.     He underwent a cardiac catheterization on 11/16/2020 --> s/p multiple ZIGGY to RCA (see results below). Clinically improved, but still experiencing intermittent exertional chest pain (\"used to be an 8/10, now it's a 1 or 2\"). He is currently with no active cardiac complaints at rest. SB on EKG. He is a . His son Anjelica Tafoya) is a cardiothoracic surgery fellow at [employer] and his daughter is a nurse at [employer].     Review of Systems:  Cardiac: As per HPI  General: No fever, chills  Pulmonary: As per HPI  HEENT: No visual disturbances, difficult swallowing  GI: No nausea, vomiting  : No dysuria, hematuria  Endocrine: No thyroid disease or DM  Musculoskeletal: BARRERA x 4, no focal motor deficits  Skin: Intact, no rashes  Neuro: No headache, seizures  Psych: Currently with no depression, anxiety    Past Medical History:  Past Medical History:   Diagnosis Date    CAD in native artery 11/16/2020    H/O cardiovascular stress test 05/16/2018    lexiscan    Heartburn     Hypertension        Past Surgical History:  Past Surgical History:   Procedure Laterality Date    CORONARY ANGIOPLASTY WITH STENT PLACEMENT  11/16/2020    2.25 x 22 Taras, 2.0 x12 [place] to the R-PLD and a 3.0 x 18 [place] to the Mid RCA by Dr. Nisreen Johnson         Family History:  Family History   Problem Relation Age of Onset    COPD Father     Emphysema Father     Coronary Art Dis Maternal Grandfather        Social History:  Social History     Socioeconomic History    Marital status:      Spouse name: Not on file    Number of children: Not on file    Years of education: Not on file    Highest education level: Not on file   Occupational History    Not on file   Social Needs    Financial resource strain: Not on file    Food insecurity     Worry: Not on file     Inability: Not on file    Transportation needs     Medical: Not on file     Non-medical: Not on file   Tobacco Use    Smoking status: Never Smoker    Smokeless tobacco: Never Used   Substance and Sexual Activity    Alcohol use: Yes     Comment: socailly    Drug use: No    Sexual activity: Not on file   Lifestyle    Physical activity     Days per week: Not on file     Minutes per session: Not on file    Stress: Not on file   Relationships    Social connections     Talks on phone: Not on file     Gets together: Not on file     Attends Rastafari service: Not on file     Active member of club or organization: Not on file     Attends meetings of clubs or organizations: Not on file     Relationship status: Not on file    Intimate partner violence     Fear of current or ex partner: Not on file     Emotionally abused: Not on file     Physically abused: Not on file     Forced sexual activity: Not on file   Other Topics Concern    Not on file   Social History Narrative    Not on file       Allergies:   Allergies   Allergen Reactions    Cozaar [Losartan] Other (See Comments)     Dizziness, lightheaded, jittery       Current Medications:  Current Outpatient Medications   Medication Sig Dispense Refill    amLODIPine (NORVASC) 5 MG tablet Take 1 tablet by mouth daily 30 tablet 11    metoprolol tartrate (LOPRESSOR) 25 MG tablet Take 25 mg by mouth 2 times daily      atorvastatin (LIPITOR) 40 MG tablet Take 1 tablet by mouth nightly 90 tablet 3    clopidogrel (PLAVIX) 75 MG tablet Take 1 tablet by mouth daily 30 tablet 3    aspirin 81 MG EC tablet Take 81 mg by mouth daily      Flaxseed, Linseed, (FLAX SEED OIL) 1000 MG CAPS Take 1,000 mg by mouth daily      Cinnamon 500 MG CAPS Take 500 mg by mouth daily       No current facility-administered medications for this visit. Physical Exam:  There were no vitals taken for this visit. Wt Readings from Last 3 Encounters:   11/16/20 180 lb (81.6 kg)   11/13/20 190 lb 9.6 oz (86.5 kg)   11/02/20 187 lb (84.8 kg)     Appearance: Awake, alert, no acute respiratory distress  Skin: Intact, no rash  Head: Normocephalic, atraumatic  Eyes: EOMI, no conjunctival erythema  ENMT: No pharyngeal erythema, MMM, no rhinorrhea  Neck: Supple, no elevated JVP, no carotid bruits  Lungs: Clear to auscultation bilaterally. No wheezes, rales, or rhonchi.   Cardiac: Regular rate and rhythm, +S1S2, no murmurs apparent  Abdomen: Soft, nontender, +bowel sounds  Extremities: Moves all extremities x 4, no lower extremity edema  Neurologic: No focal motor deficits apparent, normal mood and affect  Peripheral Pulses: Intact posterior tibial pulses bilaterally    Laboratory Tests:  Lab Results   Component Value Date    CREATININE 1.0 11/17/2020    BUN 17 11/17/2020     11/17/2020    K 4.0 11/17/2020    CL 99 11/17/2020    CO2 24 11/17/2020     No results found for: MG  Lab Results   Component Value Date    WBC 8.3 11/17/2020    HGB 15.2 11/17/2020    HCT 43.7 11/17/2020    MCV 88.6 11/17/2020     11/17/2020     Lab Results   Component Value Date    ALT 41 (H) 08/04/2020    AST 39 08/04/2020    ALKPHOS 94 08/04/2020    BILITOT 0.4 08/04/2020     No results found for: CKTOTAL, CKMB, mid-sized vessel without any significant  disease. The left circumflex artery is a mid-sized vessel that gives  off two OM branches. The left circumflex artery and its branches have  no significant disease. There was grade 3 left-to-right collaterals  noted from the left coronary system into the right PDA/PLV.     The right coronary artery arises normally from the right sinus of  Valsalva. It is a large vessel, dominant circulation that has subtotal  occlusion in the mid segment. There was also two sequential lesions  involving the distal RCA. 1.  Subtotal occlusion of the mid RCA with successful deployment of 3.0  x 18 mm [place] Resolute drug-eluting stent (pre-PCI MAYTE-2 flow, post-PCI  MAYTE-3 flow with 0% residual stenosis). 2.  Significant disease involving the distal RCA to the right PDA with  successful deployment of 2.25 x 22 mm and 2.0 x 12 mm [place] Resolute  drug-eluting stents in overlapping fashion (pre-PCI MAYTE-2 flow,  post-PCI MAYTE-3 flow with 10% residual stenosis). 3.  50% to 60% stenosis involving the mid LAD with luminal  irregularities involving the rest of the vessel. 4.  No significant disease involving the ramus intermedius artery and left  circumflex artery. 5.  Preserved LV function. ASSESSMENT / PLAN:  1. Exertional chest pain (see HPI)  2. CAD s/p multiple ZIGGY to RCA territory on 11/16/2020; 50-60% mid LAD stenosis  3. HTN -- controlled  4. HLD  5. Hgb A1c 6.2  6. GERD -- on PPI  7. IBS  8.  Sinus bradycardia -- on BB    - Exercise nuclear stress test ordered today  - Continue ASA, plavix (switched from brilinta to plavix prior to discharge following cardiac catheterization d/t SOB), statin, metoprolol 25 mg BID (did not tolerate 50 mg BID), and norvasc (added 11/30/2020)  - Home BP monitoring  - Aggressive risk factor modifications    Keisha Baeza MD  [facility]) Cardiology

## 2020-12-14 ENCOUNTER — TELEPHONE (OUTPATIENT)
Dept: CARDIOLOGY | Age: 65
End: 2020-12-14

## 2020-12-14 NOTE — TELEPHONE ENCOUNTER
12/14/20 1517 LEFT message for Casey Bañuelos Reminder Call for 12/16/20 Nuclear Stress test @  included Pre procedure stress instructions and COVID check list.   Encouraged to return  phone call

## 2020-12-16 ENCOUNTER — HOSPITAL ENCOUNTER (OUTPATIENT)
Dept: CARDIOLOGY | Age: 65
Discharge: HOME | End: 2020-12-16
Payer: COMMERCIAL

## 2020-12-16 ENCOUNTER — TELEPHONE (OUTPATIENT)
Dept: CARDIOLOGY CLINIC | Age: 65
End: 2020-12-16

## 2020-12-16 VITALS
SYSTOLIC BLOOD PRESSURE: 136 MMHG | DIASTOLIC BLOOD PRESSURE: 80 MMHG | WEIGHT: 188 LBS | BODY MASS INDEX: 25.47 KG/M2 | RESPIRATION RATE: 18 BRPM | HEIGHT: 72 IN | TEMPERATURE: 97.7 F | HEART RATE: 55 BPM

## 2020-12-16 LAB
LV EF: 69 %
LVEF MODALITY: NORMAL

## 2020-12-16 PROCEDURE — 2580000003 HC RX 258: Performed by: INTERNAL MEDICINE

## 2020-12-16 PROCEDURE — A9500 TC99M SESTAMIBI: HCPCS | Performed by: INTERNAL MEDICINE

## 2020-12-16 PROCEDURE — 78452 HT MUSCLE IMAGE SPECT MULT: CPT

## 2020-12-16 PROCEDURE — 93017 CV STRESS TEST TRACING ONLY: CPT

## 2020-12-16 PROCEDURE — 3430000000 HC RX DIAGNOSTIC RADIOPHARMACEUTICAL: Performed by: INTERNAL MEDICINE

## 2020-12-16 RX ORDER — SODIUM CHLORIDE 0.9 % (FLUSH) 0.9 %
10 SYRINGE (ML) INJECTION PRN
Status: DISCONTINUED | OUTPATIENT
Start: 2020-12-16 | End: 2020-12-17 | Stop reason: HOSPADM

## 2020-12-16 RX ORDER — PANTOPRAZOLE SODIUM 40 MG/1
40 TABLET, DELAYED RELEASE ORAL DAILY
COMMUNITY
End: 2021-05-18

## 2020-12-16 RX ADMIN — Medication 31.6 MILLICURIE: at 09:06

## 2020-12-16 RX ADMIN — SODIUM CHLORIDE, PRESERVATIVE FREE 10 ML: 5 INJECTION INTRAVENOUS at 09:06

## 2020-12-16 RX ADMIN — Medication 10.2 MILLICURIE: at 07:42

## 2020-12-16 RX ADMIN — SODIUM CHLORIDE, PRESERVATIVE FREE 10 ML: 5 INJECTION INTRAVENOUS at 07:42

## 2020-12-16 NOTE — TELEPHONE ENCOUNTER
----- Message from Mariluz Pittman MD sent at 12/16/2020  2:01 PM EST -----  Normal stress test images, normal EF, and above average exercise capacity

## 2020-12-16 NOTE — PROCEDURES
[address] [facility] [address] [phone]                Exercise Stress Nuclear Gated SPECT Study    Name: [facility] Account Number: [de-identified]    :  1955      Sex: male              Date of Study:  2020    Height: 6' (182.9 cm)  Weight: 188 lb (85.3 kg)     Ordering Provider: Jason Boggs MD          PCP: Armida Mojica DO      Cardiologist: Jason Boggs MD                        Interpreting Physician: Angele Aschoff, DO  _________________________________________________________________________________    Indication:   Evaluation of extent and severity of coronary artery disease    Clinical History:   Patient has prior history of coronary artery disease. Resting ECG:    Sinus bradycardia    Exercise: The patient exercised using a Say protocol, completing 10:35 minutes and reaching an estimated work load of 34.4 metabolic equivalents (METS). Resting HR was 55. Peak exercise heart rate was 148 ( 94% of maximum predicted heart rate for age). Baseline /80. Peak exercise /76. The blood pressure response to exercise was normal      Exercise was terminated due to heart rate attained and dyspnea. The patient experienced no chest pain with exercise. Exercise ECG:   The patient demonstrated one PVC couplet during exercise. With exercise, there were no ST segment changes of significance at the heart rate achieved. Bowie treadmill score was 10.5 implying low risk. IMAGING: Myocardial perfusion imaging was performed at rest 30-35 minutes following the intravenous injection of 10.2 mCi of (Tc-Sestamibi) followed by 10 ml of Normal Saline. At peak exercise, the patient was injected intravenously with 31.6 mCi of (Tc-Sestamibi) followed by 10 ml of Normal Saline. Gated post-stress tomographic imaging was performed 20-25 minutes after stress.      FINDINGS: The overall quality of the study was good. Left ventricular cavity size was noted to be normal.    Rotational analog analysis demonstrated no patient motion or abnormal extracardiac radioactivity. The gated SPECT stress imaging in the short, vertical long, and horizontal long axis demonstrated normal homogeneous tracer distribution throughout the myocardium. The resting images are normal.     Gated SPECT left ventricular ejection fraction was calculated to be 69%, with normal myocardial thickening and wall motion. Impression:    1. Exercise EKG was negative. 2. The patient experienced no chest pain with exercise. 3. The myocardial perfusion imaging was normal.    4. Overall left ventricular systolic function was normal without regional wall motion abnormalities. 5. Bowie treadmill score was 10.5 implying low risk. 6. Exercise capacity was above average. 7. Low risk general exercise treadmill test.    Thank you for sending your patient to this [employer].      Electronically signed by Salomon Mohr DO on 12/16/20 at 11:55 AM EST

## 2020-12-28 RX ORDER — AMLODIPINE BESYLATE 5 MG/1
5 TABLET ORAL DAILY
Qty: 90 TABLET | Refills: 3 | Status: SHIPPED
Start: 2020-12-28 | End: 2021-11-01 | Stop reason: SDUPTHER

## 2021-01-13 RX ORDER — CLOPIDOGREL BISULFATE 75 MG/1
75 TABLET ORAL DAILY
Qty: 90 TABLET | Refills: 3 | Status: SHIPPED
Start: 2021-01-13 | End: 2022-01-25

## 2021-02-09 ENCOUNTER — TELEPHONE (OUTPATIENT)
Dept: CARDIOLOGY CLINIC | Age: 66
End: 2021-02-09

## 2021-02-09 NOTE — TELEPHONE ENCOUNTER
Patient sent an email stating he needs a refill of Atorvastatin, however he is having muscle soreness. He is inquiring whether there is an alternative. Please advise.

## 2021-02-15 RX ORDER — ATORVASTATIN CALCIUM 20 MG/1
20 TABLET, FILM COATED ORAL DAILY
Qty: 180 TABLET | Refills: 3 | Status: SHIPPED
Start: 2021-02-15 | End: 2021-04-13 | Stop reason: SINTOL

## 2021-02-15 RX ORDER — ATORVASTATIN CALCIUM 20 MG/1
20 TABLET, FILM COATED ORAL DAILY
COMMUNITY
End: 2021-02-15 | Stop reason: SDUPTHER

## 2021-02-15 NOTE — TELEPHONE ENCOUNTER
Patient notified of Dr. Frank Mcmillan recommendation. Chart amended. Lipitor e-scribed to pharmacy.

## 2021-04-12 ENCOUNTER — TELEPHONE (OUTPATIENT)
Dept: CARDIOLOGY CLINIC | Age: 66
End: 2021-04-12

## 2021-04-12 NOTE — TELEPHONE ENCOUNTER
From   Jennifer Sorto To   [facility] Clinical Staff Sent   4/12/2021  9:21 AM   I have cut my statin medication (atorvastatin Jerry.) in half for about a month now. My soreness in my muscles are worse than before. Is there another medication I can try?     The primary soreness was in my back, between my shoulder blades,   and growing area. It is worse in my back. It is now in my knees and neck. Thank you and let me know.    Jennifer Whitley

## 2021-04-13 NOTE — TELEPHONE ENCOUNTER
Patient notified of Dr. Mindi Coleman recommendations. Chart amended to reflect D/C of Lipitor. Patient will send a [employer] message to me when he is ready to start Crestor and we will e-scribe.

## 2021-05-10 ENCOUNTER — TELEPHONE (OUTPATIENT)
Dept: CARDIOLOGY CLINIC | Age: 66
End: 2021-05-10

## 2021-05-10 DIAGNOSIS — E78.00 HYPERCHOLESTEREMIA: Primary | ICD-10-CM

## 2021-05-10 DIAGNOSIS — I25.10 CAD IN NATIVE ARTERY: ICD-10-CM

## 2021-05-10 RX ORDER — ROSUVASTATIN CALCIUM 10 MG/1
10 TABLET, COATED ORAL DAILY
COMMUNITY
End: 2021-05-10 | Stop reason: SDUPTHER

## 2021-05-10 RX ORDER — ROSUVASTATIN CALCIUM 10 MG/1
10 TABLET, COATED ORAL DAILY
Qty: 30 TABLET | Refills: 11 | Status: SHIPPED
Start: 2021-05-10 | End: 2021-08-02 | Stop reason: DRUGHIGH

## 2021-05-10 NOTE — TELEPHONE ENCOUNTER
From   Dakota Madden To   [facility] Clinical Staff Sent   5/10/2021 12:23 PM   Juanita   I am over the muscle aches that I was having with the statin, I was on.(atorvastatin)   I think you mentioned Crestor 10     Please advise next step   also do I need any follow up blood work?

## 2021-07-26 ENCOUNTER — TELEPHONE (OUTPATIENT)
Dept: CARDIOLOGY CLINIC | Age: 66
End: 2021-07-26

## 2021-07-26 NOTE — TELEPHONE ENCOUNTER
We went to Crestor 10 for 60 days and the muscle aches returned as before  What are your thoughts for the future (statin) ?   Thank you   Cheyenne Grimes

## 2021-08-02 RX ORDER — ROSUVASTATIN CALCIUM 10 MG/1
10 TABLET, COATED ORAL EVERY OTHER DAY
COMMUNITY
End: 2022-08-03

## 2021-11-01 RX ORDER — AMLODIPINE BESYLATE 5 MG/1
5 TABLET ORAL DAILY
Qty: 90 TABLET | Refills: 3 | Status: SHIPPED | OUTPATIENT
Start: 2021-11-01

## 2021-11-02 ENCOUNTER — TELEPHONE (OUTPATIENT)
Dept: CARDIOLOGY CLINIC | Age: 66
End: 2021-11-02

## 2021-11-02 RX ORDER — METOPROLOL TARTRATE 50 MG/1
50 TABLET, FILM COATED ORAL 2 TIMES DAILY
COMMUNITY
End: 2021-11-29

## 2021-11-02 NOTE — TELEPHONE ENCOUNTER
Patient sent a "[employer] message asking for a refill of Metoprolol 25 mg. Once the script was sent a note was received from the pharmacy to clarify the dose stating patient has been taking 50 mg BID. I spoke with patient and he does state he has been taking Metoprolol 50 mg BID. He had an old bottle at home and does not need a refill. Please advise if okay to remain on 50 mg BID.

## 2021-11-29 RX ORDER — METOPROLOL TARTRATE 50 MG/1
TABLET, FILM COATED ORAL
Qty: 60 TABLET | Refills: 3 | Status: ON HOLD
Start: 2021-11-29 | End: 2022-08-04 | Stop reason: ALTCHOICE

## 2021-11-30 ENCOUNTER — OFFICE VISIT (OUTPATIENT)
Dept: FAMILY MEDICINE CLINIC | Age: 66
End: 2021-11-30
Payer: COMMERCIAL

## 2021-11-30 VITALS
TEMPERATURE: 97.8 F | HEART RATE: 54 BPM | WEIGHT: 188 LBS | SYSTOLIC BLOOD PRESSURE: 138 MMHG | BODY MASS INDEX: 25.47 KG/M2 | HEIGHT: 72 IN | RESPIRATION RATE: 17 BRPM | DIASTOLIC BLOOD PRESSURE: 84 MMHG | OXYGEN SATURATION: 98 %

## 2021-11-30 DIAGNOSIS — B96.89 ACUTE BACTERIAL SINUSITIS: Primary | ICD-10-CM

## 2021-11-30 DIAGNOSIS — J01.90 ACUTE BACTERIAL SINUSITIS: Primary | ICD-10-CM

## 2021-11-30 LAB
Lab: NORMAL
PERFORMING INSTRUMENT: NORMAL
QC PASS/FAIL: NORMAL
SARS-COV-2, POC: NORMAL

## 2021-11-30 PROCEDURE — 87426 SARSCOV CORONAVIRUS AG IA: CPT | Performed by: STUDENT IN AN ORGANIZED HEALTH CARE EDUCATION/TRAINING PROGRAM

## 2021-11-30 PROCEDURE — 99212 OFFICE O/P EST SF 10 MIN: CPT | Performed by: STUDENT IN AN ORGANIZED HEALTH CARE EDUCATION/TRAINING PROGRAM

## 2021-11-30 RX ORDER — AZITHROMYCIN 250 MG/1
250 TABLET, FILM COATED ORAL SEE ADMIN INSTRUCTIONS
Qty: 6 TABLET | Refills: 0 | Status: SHIPPED
Start: 2021-11-30 | End: 2021-12-03

## 2021-11-30 RX ORDER — AMOXICILLIN AND CLAVULANATE POTASSIUM 875; 125 MG/1; MG/1
1 TABLET, FILM COATED ORAL 2 TIMES DAILY
Qty: 14 TABLET | Refills: 0 | Status: SHIPPED
Start: 2021-11-30 | End: 2021-12-03

## 2021-11-30 SDOH — ECONOMIC STABILITY: FOOD INSECURITY: WITHIN THE PAST 12 MONTHS, THE FOOD YOU BOUGHT JUST DIDN'T LAST AND YOU DIDN'T HAVE MONEY TO GET MORE.: NEVER TRUE

## 2021-11-30 SDOH — ECONOMIC STABILITY: FOOD INSECURITY: WITHIN THE PAST 12 MONTHS, YOU WORRIED THAT YOUR FOOD WOULD RUN OUT BEFORE YOU GOT MONEY TO BUY MORE.: NEVER TRUE

## 2021-11-30 ASSESSMENT — PATIENT HEALTH QUESTIONNAIRE - PHQ9
SUM OF ALL RESPONSES TO PHQ QUESTIONS 1-9: 0
2. FEELING DOWN, DEPRESSED OR HOPELESS: 0
1. LITTLE INTEREST OR PLEASURE IN DOING THINGS: 0
SUM OF ALL RESPONSES TO PHQ9 QUESTIONS 1 & 2: 0
SUM OF ALL RESPONSES TO PHQ QUESTIONS 1-9: 0
SUM OF ALL RESPONSES TO PHQ QUESTIONS 1-9: 0

## 2021-11-30 ASSESSMENT — SOCIAL DETERMINANTS OF HEALTH (SDOH): HOW HARD IS IT FOR YOU TO PAY FOR THE VERY BASICS LIKE FOOD, HOUSING, MEDICAL CARE, AND HEATING?: NOT HARD AT ALL

## 2021-11-30 NOTE — PROGRESS NOTES
Chief Complaint       Sinus Problem (sinus congestion, drainage, headache, cough started sunday )      History of Present Illness   Source of history provided by:  patient. Cherrie Jeronimo is a 61 y.o. old male presenting to the walk in clinic for evaluation of sinus pressure, nasal congestion, discolored nasal drainage, headache, and mild productive cough  x 5 days. Has been taking mucinex and Robitussin OTC without relief. Denies any fever, chills, wheezing, CP, SOB, or GI symptoms. Denies any hx of asthma, COPD, or tobacco use. Denies any contact with any individuals with known COVID-19 infection or under investigation for COVID-19 infection. Pt has been vaccinated for COVID-19. ROS    Unless otherwise stated in this report or unable to obtain because of the patient's clinical or mental status as evidenced by the medical record, this patients's positive and negative responses for Review of Systems, constitutional, psych, eyes, ENT, cardiovascular, respiratory, gastrointestinal, neurological, genitourinary, musculoskeletal, integument systems and systems related to the presenting problem are either stated in the preceding or were not pertinent or were negative for the symptoms and/or complaints related to the medical problem. Past Medical History:  has a past medical history of CAD in native artery, H/O cardiovascular stress test, Heartburn, and Hypertension. Past Surgical History:  has a past surgical history that includes hernia repair and Coronary angioplasty with stent (11/16/2020). Social History:  reports that he has never smoked. He has never used smokeless tobacco. He reports previous alcohol use. He reports that he does not use drugs. Family History: family history includes COPD in his father; Coronary Art Dis in his maternal grandfather; Emphysema in his father.    Allergies: Cozaar [losartan] and Lipitor [atorvastatin]    Physical Exam         VS:  /84 (Site: Left Upper Arm, Position: Sitting, Cuff Size: Large Adult)   Pulse 54   Temp 97.8 °F (36.6 °C) (Temporal)   Resp 17   Ht 6' (1.829 m)   Wt 188 lb (85.3 kg)   SpO2 98%   BMI 25.50 kg/m²    Oxygen Saturation Interpretation: Normal.    Constitutional:  Alert, development consistent with age. Head:  TTP over the maxillary sinuses. Ears:  External Ears: Bilateral pinna normal. TMs  without erythema or perforation bilaterally. Canals normal bilaterally without swelling or exudate  Nose:   congestion of the nasal mucosa. There is  injection to middle turbinates bilaterally. Throat:  posterior pharyngeal erythema with mild post nasal drip present. No exudate or tonsillar hypertrophy noted. Neck:  Supple. There is no anterior cervical adenopathy. Lungs: CTAB without wheezes, rales, or rhonchi  Heart:  Regular rate and rhythm, normal heart sounds, without pathological murmurs, ectopy, gallops, or rubs. Skin:  Normal turgor. Warm, dry, without visible rash. Neurological:  Alert and oriented. Motor functions intact. Responds to verbal commands. Lab / Imaging Results   (All laboratory and radiology results have been personally reviewed by myself)  Labs:  No results found for this visit on 11/30/21. Imaging: All Radiology results interpreted by Radiologist unless otherwise noted. Assessment / Plan     Impression(s):  Milton Urbina was seen today for sinus problem. Diagnoses and all orders for this visit:    Acute bacterial sinusitis  -     amoxicillin-clavulanate (AUGMENTIN) 875-125 MG per tablet; Take 1 tablet by mouth 2 times daily for 7 days    COVID testing negative    Disposition:  Disposition: Discharge to home    Script written for above, side effects discussed. Increase fluids and rest. Symptomatic relief discussed. F/u PCP in 5-7 days if symptoms persist. ED sooner if symptoms worsen or change. Red flag symptoms discussed. Pt is in agreement with this care plan. All questions answered.     Hillary Burton MD

## 2021-12-03 RX ORDER — IPRATROPIUM BROMIDE 42 UG/1
2 SPRAY, METERED NASAL 4 TIMES DAILY
Qty: 15 ML | Refills: 3 | Status: ON HOLD
Start: 2021-12-03 | End: 2022-08-04 | Stop reason: HOSPADM

## 2021-12-03 RX ORDER — DEXAMETHASONE 4 MG/1
4 TABLET ORAL 2 TIMES DAILY WITH MEALS
Qty: 20 TABLET | Refills: 0 | Status: SHIPPED | OUTPATIENT
Start: 2021-12-03 | End: 2021-12-13

## 2021-12-03 RX ORDER — BROMPHENIRAMINE MALEATE, PSEUDOEPHEDRINE HYDROCHLORIDE, AND DEXTROMETHORPHAN HYDROBROMIDE 2; 30; 10 MG/5ML; MG/5ML; MG/5ML
5 SYRUP ORAL 4 TIMES DAILY PRN
Qty: 120 ML | Refills: 0 | Status: SHIPPED
Start: 2021-12-03 | End: 2022-08-03

## 2021-12-03 RX ORDER — CEFDINIR 300 MG/1
300 CAPSULE ORAL 2 TIMES DAILY
Qty: 20 CAPSULE | Refills: 0 | Status: SHIPPED | OUTPATIENT
Start: 2021-12-03 | End: 2021-12-13

## 2021-12-20 RX ORDER — PANTOPRAZOLE SODIUM 40 MG/1
TABLET, DELAYED RELEASE ORAL
Qty: 30 TABLET | Refills: 5 | Status: SHIPPED
Start: 2021-12-20 | End: 2022-06-13 | Stop reason: SDUPTHER

## 2022-01-25 RX ORDER — CLOPIDOGREL BISULFATE 75 MG/1
TABLET ORAL
Qty: 90 TABLET | Refills: 0 | Status: SHIPPED
Start: 2022-01-25 | End: 2022-08-03

## 2022-02-21 RX ORDER — ONDANSETRON 4 MG/1
4 TABLET, ORALLY DISINTEGRATING ORAL 3 TIMES DAILY PRN
Qty: 21 TABLET | Refills: 0 | Status: SHIPPED
Start: 2022-02-21 | End: 2022-02-23 | Stop reason: SDUPTHER

## 2022-02-21 RX ORDER — DICYCLOMINE HYDROCHLORIDE 10 MG/1
10 CAPSULE ORAL 4 TIMES DAILY
Qty: 80 CAPSULE | Refills: 0 | Status: SHIPPED
Start: 2022-02-21 | End: 2022-02-23 | Stop reason: SDUPTHER

## 2022-02-21 RX ORDER — SUCRALFATE 1 G/1
1 TABLET ORAL 4 TIMES DAILY
Qty: 40 TABLET | Refills: 0 | Status: SHIPPED
Start: 2022-02-21 | End: 2022-02-23 | Stop reason: SDUPTHER

## 2022-02-23 RX ORDER — DICYCLOMINE HYDROCHLORIDE 10 MG/1
10 CAPSULE ORAL 4 TIMES DAILY
Qty: 80 CAPSULE | Refills: 0 | Status: SHIPPED
Start: 2022-02-23 | End: 2022-08-03

## 2022-02-23 RX ORDER — SUCRALFATE 1 G/1
1 TABLET ORAL 4 TIMES DAILY
Qty: 40 TABLET | Refills: 0 | Status: ON HOLD
Start: 2022-02-23 | End: 2022-08-04

## 2022-02-23 RX ORDER — ONDANSETRON 4 MG/1
4 TABLET, ORALLY DISINTEGRATING ORAL 3 TIMES DAILY PRN
Qty: 21 TABLET | Refills: 0 | Status: SHIPPED
Start: 2022-02-23 | End: 2022-08-03

## 2022-06-13 ENCOUNTER — OFFICE VISIT (OUTPATIENT)
Dept: FAMILY MEDICINE CLINIC | Age: 67
End: 2022-06-13
Payer: COMMERCIAL

## 2022-06-13 VITALS
HEART RATE: 70 BPM | BODY MASS INDEX: 24.28 KG/M2 | OXYGEN SATURATION: 98 % | WEIGHT: 179 LBS | DIASTOLIC BLOOD PRESSURE: 70 MMHG | SYSTOLIC BLOOD PRESSURE: 120 MMHG | TEMPERATURE: 97.3 F

## 2022-06-13 DIAGNOSIS — K52.9 ACUTE GASTROENTERITIS: Primary | ICD-10-CM

## 2022-06-13 DIAGNOSIS — I10 ESSENTIAL HYPERTENSION: ICD-10-CM

## 2022-06-13 DIAGNOSIS — K21.00 GASTROESOPHAGEAL REFLUX DISEASE WITH ESOPHAGITIS WITHOUT HEMORRHAGE: ICD-10-CM

## 2022-06-13 DIAGNOSIS — I25.10 CAD IN NATIVE ARTERY: ICD-10-CM

## 2022-06-13 PROCEDURE — 99214 OFFICE O/P EST MOD 30 MIN: CPT | Performed by: FAMILY MEDICINE

## 2022-06-13 RX ORDER — PANTOPRAZOLE SODIUM 40 MG/1
TABLET, DELAYED RELEASE ORAL
Qty: 30 TABLET | Refills: 0 | Status: ON HOLD
Start: 2022-06-13 | End: 2022-08-04 | Stop reason: SDUPTHER

## 2022-06-13 RX ORDER — EZETIMIBE 10 MG/1
TABLET ORAL
COMMUNITY
Start: 2022-06-01

## 2022-06-13 NOTE — PATIENT INSTRUCTIONS
LOW SALT FOR BLOOD PRESSURE CONTROL. LOW FAT DIET FOR CHOLESTEROL CONTROL. DRINK ENOUGH FLUIDS FOR BETTER KIDNEY FUNCTION. TAKE AMLODIPINE 5 MG. AND LOPRESSOR 50 MG. DAILY  FOR BLOOD PRESSURE CONTROL. TAKE  CRESTOR 10 MG M-W-F NIGHTLY AND ZETIA 10 MG. DAILY   FOR CHOLESTEROL CONTROL. [facility] TAKE IMMODIUM 2 MG. 4 TIMES A DAY AS NEEDED FOR DIARRHEA CONTROL. TAKE PROTONIX 40 MG. DAILY AS NEEDED FOR HEARTBURN. REGULAR WALKING ADVISED. NEXT APPOINTMENT IN 1 MONTH WITH DR. Tigist Garcia.

## 2022-06-13 NOTE — PROGRESS NOTES
OFFICE PROGRESS NOTE      SUBJECTIVE:        Patient ID:   Cedrick Carr is a 61 y.o. male who presents for   Chief Complaint   Patient presents with    Nausea & Vomiting    Diarrhea           HPI:     HAVING DIARRHEA AND VOMITING FOR PAST 4 DAYS. TODAY NO VOMITING AND TOLERATING ORAL INTAKE. STILL HAVING SOME LOOSE BOWEL MOVEMENT. ALSO HAVING ACID REFLUX. REQUEST REFILL OF PROTONIX. RECHECK BP, CHOLESTEROL AND CAD. MEDICATION REFILL. STARTED EATING GOOD TODAY. GOOD. NO  EXERCISE. TAKING MEDICATIONS REGULARLY. Prior to Admission medications    Medication Sig Start Date End Date Taking?  Authorizing Provider   pantoprazole (PROTONIX) 40 MG tablet TAKE ONE TABLET BY MOUTH EVERY MORNING before breakfast 6/13/22  Yes Twila Pardo MD   metoprolol tartrate (LOPRESSOR) 50 MG tablet TAKE ONE TABLET BY MOUTH TWO TIMES A DAY 11/29/21  Yes Berkley Vogel MD   amLODIPine (NORVASC) 5 MG tablet Take 1 tablet by mouth daily 11/1/21  Yes Berkley Vogel MD   ezetimibe (ZETIA) 10 mg tablet TAKE 1 TABLET BY MOUTH EVERY DAY 6/1/22   Historical Provider, MD   ondansetron (ZOFRAN-ODT) 4 MG disintegrating tablet Take 1 tablet by mouth 3 times daily as needed for Nausea or Vomiting  Patient not taking: Reported on 6/13/2022 2/23/22   Denilson Estevez DO   sucralfate (CARAFATE) 1 GM tablet Take 1 tablet by mouth 4 times daily for 10 days 2/23/22 3/5/22  Denilson Estevez DO   dicyclomine (BENTYL) 10 MG capsule Take 1 capsule by mouth 4 times daily for 10 days 2/23/22 3/5/22  Denilson Estevez DO   clopidogrel (PLAVIX) 75 MG tablet TAKE ONE TABLET BY MOUTH DAILY  Patient not taking: Reported on 6/13/2022 1/25/22   Berkley Vogel MD   ipratropium (ATROVENT) 0.06 % nasal spray 2 sprays by Each Nostril route 4 times daily  Patient not taking: Reported on 6/13/2022 12/3/21   Denilson Estevez DO   brompheniramine-pseudoephedrine-DM (BROMFED DM) 2-30-10 MG/5ML syrup Take 5 mLs by mouth 4 times daily as needed for Congestion or Cough  Patient not taking: Reported on 6/13/2022 12/3/21   Michelle Medrano DO   rosuvastatin (CRESTOR) 10 MG tablet Take 10 mg by mouth every other day  Patient not taking: Reported on 11/30/2021    Historical Provider, MD   Ascorbic Acid (VITAMIN C PO) Take 3,000 mg by mouth daily  Patient not taking: Reported on 6/13/2022    Historical Provider, MD   aspirin 81 MG EC tablet Take 81 mg by mouth daily    Historical Provider, MD   Flaxseed, Linseed, (FLAX SEED OIL) 1000 MG CAPS Take 1,000 mg by mouth daily  Patient not taking: Reported on 6/13/2022    Historical Provider, MD   Cinnamon 500 MG CAPS Take 500 mg by mouth daily    Historical Provider, MD     Social History     Socioeconomic History    Marital status:      Spouse name: Not on file    Number of children: Not on file    Years of education: Not on file    Highest education level: Not on file   Occupational History    Not on file   Tobacco Use    Smoking status: Never Smoker    Smokeless tobacco: Never Used   Vaping Use    Vaping Use: Never used   Substance and Sexual Activity    Alcohol use: Not Currently     Comment: rarely    Drug use: No    Sexual activity: Not on file   Other Topics Concern    Not on file   Social History Narrative    Not on file     Social Determinants of Health     Financial Resource Strain: Low Risk     Difficulty of Paying Living Expenses: Not hard at all   Food Insecurity: No Food Insecurity    Worried About [facility] in the Last Year: Never true    [facility] in the Last Year: Never true   Transportation Needs:     Lack of Transportation (Medical): Not on file    Lack of Transportation (Non-Medical):  Not on file   Physical Activity:     Days of Exercise per Week: Not on file    Minutes of Exercise per Session: Not on file   Stress:     Feeling of Stress : Not on file   Social Connections:     Frequency of Communication with Friends and Family: Not on file    Frequency of Social Gatherings with Friends and Family: Not on file    Attends Gnosticism Services: Not on file    Active Member of Clubs or Organizations: Not on file    Attends Club or Organization Meetings: Not on file    Marital Status: Not on file   Intimate Partner Violence:     Fear of Current or Ex-Partner: Not on file    Emotionally Abused: Not on file    Physically Abused: Not on file    Sexually Abused: Not on file   Housing Stability:     Unable to Pay for Housing in the Last Year: Not on file    Number of [place] in the Last Year: Not on file    Unstable Housing in the Last Year: Not on file       I have reviewed Román's allergies, medications, problem list, medical, social and family history and have updated as needed in the electronic medical record  Review Of Systems:    Skin: no abnormal pigmentation, rash, scaling, itching, masses, hair or nail changes  Eyes: no blurring, diplopia, or eye pain  Ears/Nose/Throat: no hearing loss, tinnitus, vertigo, nosebleed, nasal congestion, rhinorrhea, sore throat  Respiratory: no cough, pleuritic chest pain, dyspnea, or wheezing  Cardiovascular: no chest pain, angina, dyspnea on exertion, orthopnea, PND, palpitations, or claudication  Gastrointestinal: no nausea, vomiting, heartburn, diarrhea, constipation, bloating,  abdominal pain, or blood per rectum. Appetite is good  Genitourinary: no urinary urgency, frequency, dysuria, nocturia, hesitancy, or incontinence  Musculoskeletal: joint pains off and on. Morning stiffness.  Ambulating well  Neurologic: no paralysis, paresis, paresthesia, seizures, tremors, or headaches  Hematologic/Lymphatic/Immunologic: no anemia, abnormal bleeding/bruising, fever, chills, night sweats, swollen glands, or unexplained weight loss  Endocrine: no heat or cold intolerance and no polyphagia, polydipsia, or polyuria        OBJECTIVE:     VS:  Wt Readings from Last 3 Encounters:   06/13/22 179 lb (81.2 kg) 11/30/21 188 lb (85.3 kg)   12/16/20 188 lb (85.3 kg)     Temp Readings from Last 3 Encounters:   06/13/22 97.3 °F (36.3 °C)   11/30/21 97.8 °F (36.6 °C) (Temporal)   12/16/20 97.7 °F (36.5 °C) (Infrared)     BP Readings from Last 3 Encounters:   06/13/22 120/70   11/30/21 138/84   12/16/20 136/80        General appearance: Alert, Awake, Oriented times 3, no distress  Skin: Warm and dry  Head: Normocephalic. No masses, lesions or tenderness noted  Eyes: Conjunctivae appear normal. PERLE  Ears: External ears normal  Nose/Sinuses: Nares normal. Septum midline. Mucosa normal. No drainage  Oropharynx: Oropharynx clear with no exudate seen  Neck: Neck supple. No jugular venous distension, lymphadenopathy or thyromegaly Trachea midline  Chest:  Normal. Movements are Normal and Equal.  Lungs: Lungs clear to auscultation bilaterally. No ronchi, crackles or wheezes  Heart: S1 S2  Regular rate and rhythm. No rub, murmur or gallop  Abdomen: Abdomen soft, non-tender. BS normal. No masses, organomegaly. Back: Grossly Normal and Equal. DTR are Normal. SLR is Normal.  Extremities: Arthritic changes are noted. Movements are limited. Pedal pulses are normal.  Musculoskeletal: Muscular strength appears intact. No joint effusion, tenderness, swelling or warmth  Neuro:  No focal motor or sensory deficits        ASSESSMENT     Patient Active Problem List    Diagnosis Date Noted    CAD in native artery 11/16/2020    Essential hypertension 06/09/2018        Diagnosis:     ICD-10-CM    1. Acute gastroenteritis  K52.9 CBC with Auto Differential    RESOLVING   2. Gastroesophageal reflux disease with esophagitis without hemorrhage  K21.00     ACTIVE    3. CAD in native artery  I25.10 Comprehensive Metabolic Panel     Lipid Panel    STABLE   4. Essential hypertension  I10     CONTROLLED       PLAN:           Patient Instructions   LOW SALT FOR BLOOD PRESSURE CONTROL. LOW FAT DIET FOR CHOLESTEROL CONTROL.   Tiera Rosen FLUIDS FOR BETTER KIDNEY FUNCTION. TAKE AMLODIPINE 5 MG. AND LOPRESSOR 50 MG. DAILY  FOR BLOOD PRESSURE CONTROL. TAKE  CRESTOR 10 MG M-W-F NIGHTLY AND ZETIA 10 MG. DAILY   FOR CHOLESTEROL CONTROL. [facility] TAKE IMMODIUM 2 MG. 4 TIMES A DAY AS NEEDED FOR DIARRHEA CONTROL. TAKE PROTONIX 40 MG. DAILY AS NEEDED FOR HEARTBURN. REGULAR WALKING ADVISED. NEXT APPOINTMENT IN 1 MONTH WITH DR. Shahrzad Galarza. Return in about 1 month (around 7/13/2022) for FOLLOW UP VISIT WITH DR Mel Tillman. .         I have reviewed my findings and recommendations with Vamsi Garcias.     Electronically signed by Venus Dominguez MD on 6/13/22 at 10:53 AM EDT

## 2022-06-14 ENCOUNTER — TELEPHONE (OUTPATIENT)
Dept: FAMILY MEDICINE CLINIC | Age: 67
End: 2022-06-14

## 2022-06-14 DIAGNOSIS — R19.7 DIARRHEA OF PRESUMED INFECTIOUS ORIGIN: Primary | ICD-10-CM

## 2022-06-14 RX ORDER — DIPHENOXYLATE HYDROCHLORIDE AND ATROPINE SULFATE 2.5; .025 MG/1; MG/1
1 TABLET ORAL 4 TIMES DAILY PRN
Qty: 40 TABLET | Refills: 0 | Status: SHIPPED | OUTPATIENT
Start: 2022-06-14 | End: 2022-06-24

## 2022-06-14 RX ORDER — PROMETHAZINE HYDROCHLORIDE 12.5 MG/1
12.5 TABLET ORAL 4 TIMES DAILY PRN
Qty: 40 TABLET | Refills: 0 | Status: SHIPPED | OUTPATIENT
Start: 2022-06-14 | End: 2022-06-24

## 2022-06-14 NOTE — TELEPHONE ENCOUNTER
Patient saw Dr. Chloe Vazquez yesterday. Patient is asking if you can call something in for Nausea. Patient has been Vomiting, and Diarrhea for 6days. Please advise. Last seen 6/13/2022  Next appt Visit date not found    [address] [place] [address].

## 2022-06-14 NOTE — TELEPHONE ENCOUNTER
Patient has been ill with diarrhea, vomiting, abd distress x 6 days. Pt went to [facility] in [place] to get a IV vitamin cocktail. However, due to his high BP, he would need an approval from you to have this treatment. Please advise.    Last seen 6/13/2022  Next appt Visit date not found

## 2022-06-15 NOTE — TELEPHONE ENCOUNTER
Patient states he will need a letter of approval for the IV vitamin cocktail. He can access and print it from his MyChart.

## 2022-06-24 ENCOUNTER — APPOINTMENT (OUTPATIENT)
Dept: CT IMAGING | Age: 67
End: 2022-06-24

## 2022-06-24 ENCOUNTER — HOSPITAL ENCOUNTER (EMERGENCY)
Age: 67
Discharge: HOME | End: 2022-06-24
Attending: EMERGENCY MEDICINE

## 2022-06-24 VITALS
TEMPERATURE: 97.3 F | HEIGHT: 72 IN | BODY MASS INDEX: 23.7 KG/M2 | RESPIRATION RATE: 16 BRPM | WEIGHT: 175 LBS | HEART RATE: 75 BPM | OXYGEN SATURATION: 99 % | SYSTOLIC BLOOD PRESSURE: 120 MMHG | DIASTOLIC BLOOD PRESSURE: 88 MMHG

## 2022-06-24 DIAGNOSIS — E86.0 DEHYDRATION: ICD-10-CM

## 2022-06-24 DIAGNOSIS — R19.7 NAUSEA VOMITING AND DIARRHEA: Primary | ICD-10-CM

## 2022-06-24 DIAGNOSIS — R11.2 NAUSEA VOMITING AND DIARRHEA: Primary | ICD-10-CM

## 2022-06-24 LAB
ALBUMIN SERPL-MCNC: 4.8 G/DL (ref 3.5–5.2)
ALP BLD-CCNC: 132 U/L (ref 40–129)
ALT SERPL-CCNC: 47 U/L (ref 0–40)
ANION GAP SERPL CALCULATED.3IONS-SCNC: 15 MMOL/L (ref 7–16)
AST SERPL-CCNC: 36 U/L (ref 0–39)
BACTERIA: ABNORMAL /HPF
BASOPHILS ABSOLUTE: 0.06 E9/L (ref 0–0.2)
BASOPHILS RELATIVE PERCENT: 0.5 % (ref 0–2)
BILIRUB SERPL-MCNC: 0.6 MG/DL (ref 0–1.2)
BILIRUBIN DIRECT: <0.2 MG/DL (ref 0–0.3)
BILIRUBIN URINE: ABNORMAL
BILIRUBIN, INDIRECT: ABNORMAL MG/DL (ref 0–1)
BLOOD, URINE: NEGATIVE
BUN BLDV-MCNC: 17 MG/DL (ref 6–23)
CALCIUM SERPL-MCNC: 10.1 MG/DL (ref 8.6–10.2)
CHLORIDE BLD-SCNC: 101 MMOL/L (ref 98–107)
CLARITY: CLEAR
CO2: 23 MMOL/L (ref 22–29)
COLOR: YELLOW
CREAT SERPL-MCNC: 1.2 MG/DL (ref 0.7–1.2)
EOSINOPHILS ABSOLUTE: 0.13 E9/L (ref 0.05–0.5)
EOSINOPHILS RELATIVE PERCENT: 1.1 % (ref 0–6)
GFR AFRICAN AMERICAN: >60
GFR NON-AFRICAN AMERICAN: >60 ML/MIN/1.73
GLUCOSE BLD-MCNC: 133 MG/DL (ref 74–99)
GLUCOSE URINE: NEGATIVE MG/DL
HCT VFR BLD CALC: 50.6 % (ref 37–54)
HEMOGLOBIN: 17.3 G/DL (ref 12.5–16.5)
IMMATURE GRANULOCYTES #: 0.03 E9/L
IMMATURE GRANULOCYTES %: 0.3 % (ref 0–5)
KETONES, URINE: 15 MG/DL
LACTIC ACID: 2 MMOL/L (ref 0.5–2.2)
LEUKOCYTE ESTERASE, URINE: ABNORMAL
LIPASE: 48 U/L (ref 13–60)
LYMPHOCYTES ABSOLUTE: 0.63 E9/L (ref 1.5–4)
LYMPHOCYTES RELATIVE PERCENT: 5.4 % (ref 20–42)
MCH RBC QN AUTO: 30.5 PG (ref 26–35)
MCHC RBC AUTO-ENTMCNC: 34.2 % (ref 32–34.5)
MCV RBC AUTO: 89.2 FL (ref 80–99.9)
MONOCYTES ABSOLUTE: 0.82 E9/L (ref 0.1–0.95)
MONOCYTES RELATIVE PERCENT: 7.1 % (ref 2–12)
MUCUS: PRESENT /LPF
NEUTROPHILS ABSOLUTE: 9.92 E9/L (ref 1.8–7.3)
NEUTROPHILS RELATIVE PERCENT: 85.6 % (ref 43–80)
NITRITE, URINE: POSITIVE
PDW BLD-RTO: 12.9 FL (ref 11.5–15)
PH UA: 6 (ref 5–9)
PLATELET # BLD: 284 E9/L (ref 130–450)
PMV BLD AUTO: 9.9 FL (ref 7–12)
POTASSIUM REFLEX MAGNESIUM: 4.7 MMOL/L (ref 3.5–5)
PROTEIN UA: NEGATIVE MG/DL
RBC # BLD: 5.67 E12/L (ref 3.8–5.8)
RBC UA: ABNORMAL /HPF (ref 0–2)
SODIUM BLD-SCNC: 139 MMOL/L (ref 132–146)
SPECIFIC GRAVITY UA: 1.01 (ref 1–1.03)
TOTAL PROTEIN: 7.6 G/DL (ref 6.4–8.3)
UROBILINOGEN, URINE: 0.2 E.U./DL
WBC # BLD: 11.6 E9/L (ref 4.5–11.5)
WBC UA: ABNORMAL /HPF (ref 0–5)

## 2022-06-24 PROCEDURE — 6370000000 HC RX 637 (ALT 250 FOR IP)

## 2022-06-24 PROCEDURE — 83605 ASSAY OF LACTIC ACID: CPT

## 2022-06-24 PROCEDURE — 2580000003 HC RX 258

## 2022-06-24 PROCEDURE — 96361 HYDRATE IV INFUSION ADD-ON: CPT

## 2022-06-24 PROCEDURE — 87045 FECES CULTURE AEROBIC BACT: CPT

## 2022-06-24 PROCEDURE — 74177 CT ABD & PELVIS W/CONTRAST: CPT

## 2022-06-24 PROCEDURE — 87324 CLOSTRIDIUM AG IA: CPT

## 2022-06-24 PROCEDURE — 80048 BASIC METABOLIC PNL TOTAL CA: CPT

## 2022-06-24 PROCEDURE — 96374 THER/PROPH/DIAG INJ IV PUSH: CPT

## 2022-06-24 PROCEDURE — 87449 NOS EACH ORGANISM AG IA: CPT

## 2022-06-24 PROCEDURE — 6360000002 HC RX W HCPCS

## 2022-06-24 PROCEDURE — 85025 COMPLETE CBC W/AUTO DIFF WBC: CPT

## 2022-06-24 PROCEDURE — 81001 URINALYSIS AUTO W/SCOPE: CPT

## 2022-06-24 PROCEDURE — 99285 EMERGENCY DEPT VISIT HI MDM: CPT

## 2022-06-24 PROCEDURE — 6360000004 HC RX CONTRAST MEDICATION: Performed by: RADIOLOGY

## 2022-06-24 PROCEDURE — 80076 HEPATIC FUNCTION PANEL: CPT

## 2022-06-24 PROCEDURE — 83690 ASSAY OF LIPASE: CPT

## 2022-06-24 RX ORDER — ONDANSETRON 2 MG/ML
4 INJECTION INTRAMUSCULAR; INTRAVENOUS ONCE
Status: COMPLETED | OUTPATIENT
Start: 2022-06-24 | End: 2022-06-24

## 2022-06-24 RX ORDER — SODIUM CHLORIDE, SODIUM LACTATE, POTASSIUM CHLORIDE, AND CALCIUM CHLORIDE .6; .31; .03; .02 G/100ML; G/100ML; G/100ML; G/100ML
1000 INJECTION, SOLUTION INTRAVENOUS ONCE
Status: COMPLETED | OUTPATIENT
Start: 2022-06-24 | End: 2022-06-24

## 2022-06-24 RX ORDER — CIPROFLOXACIN 500 MG/1
500 TABLET, FILM COATED ORAL 2 TIMES DAILY
Qty: 14 TABLET | Refills: 0 | Status: SHIPPED | OUTPATIENT
Start: 2022-06-24 | End: 2022-07-01

## 2022-06-24 RX ORDER — METRONIDAZOLE 500 MG/1
500 TABLET ORAL 3 TIMES DAILY
Qty: 21 TABLET | Refills: 0 | Status: SHIPPED | OUTPATIENT
Start: 2022-06-24 | End: 2022-07-01

## 2022-06-24 RX ADMIN — SODIUM CHLORIDE, POTASSIUM CHLORIDE, SODIUM LACTATE AND CALCIUM CHLORIDE 1000 ML: 600; 310; 30; 20 INJECTION, SOLUTION INTRAVENOUS at 11:35

## 2022-06-24 RX ADMIN — LIDOCAINE HYDROCHLORIDE: 20 SOLUTION ORAL; TOPICAL at 11:37

## 2022-06-24 RX ADMIN — ONDANSETRON 4 MG: 2 INJECTION INTRAMUSCULAR; INTRAVENOUS at 11:36

## 2022-06-24 RX ADMIN — IOPAMIDOL 50 ML: 755 INJECTION, SOLUTION INTRAVENOUS at 12:38

## 2022-06-24 ASSESSMENT — PAIN - FUNCTIONAL ASSESSMENT: PAIN_FUNCTIONAL_ASSESSMENT: NONE - DENIES PAIN

## 2022-06-24 NOTE — ED PROVIDER NOTES
61y.o. year old male presenting to the emergency room with concerns of diarrhea, emesis,  beginning 3 months ago. PAtient reports that symptom's onset 3 months ago. Worsen with eating. Improves with nothing. Severity of mild out of 10 pain, with no radiation . Symptoms are constant in timing. Symptoms described as diarrhea multiple times a day and emesis at night. Patient reports associated symptoms of fatigue. Patient in  no acute distress. Patient had negative EGD and colonscopy this week, sent in by Dr. Ha Solo. Patient reports that he previously had a parasite when traveling abroad long ago, which resolved. Chief Complaint   Patient presents with    Emesis     symptoms x 3 weeks, had negative egd and colonoscopy done / denies blood or pain     Diarrhea       Review of Systems   Constitutional: Positive for fatigue. Negative for chills and fever. HENT: Negative for congestion, rhinorrhea, trouble swallowing and voice change. Eyes: Negative for photophobia and visual disturbance. Respiratory: Negative for cough, shortness of breath and wheezing. Cardiovascular: Negative for chest pain and palpitations. Gastrointestinal: Positive for diarrhea, nausea and vomiting. Negative for abdominal pain. Genitourinary: Negative for dysuria, frequency, hematuria and urgency. Musculoskeletal: Negative for arthralgias, neck pain and neck stiffness. Skin: Negative for rash and wound. Neurological: Negative for dizziness, syncope, numbness and headaches. Psychiatric/Behavioral: Negative for behavioral problems and confusion. The patient is not nervous/anxious. Physical Exam  Vitals reviewed. Constitutional:       General: He is not in acute distress. Appearance: Normal appearance. HENT:      Head: Normocephalic. Right Ear: External ear normal.      Left Ear: External ear normal.      Nose: Nose normal.      Mouth/Throat:      Mouth: Mucous membranes are dry.    Eyes:      General: Right eye: No discharge. Left eye: No discharge. Conjunctiva/sclera: Conjunctivae normal.   Cardiovascular:      Rate and Rhythm: Normal rate and regular rhythm. Heart sounds: No friction rub. Pulmonary:      Effort: Pulmonary effort is normal. No respiratory distress. Breath sounds: No stridor. No wheezing, rhonchi or rales. Abdominal:      General: There is no distension. Palpations: Abdomen is soft. Tenderness: There is abdominal tenderness. There is no guarding or rebound. Musculoskeletal:         General: No tenderness or deformity. Cervical back: Normal range of motion and neck supple. No rigidity or tenderness. Skin:     General: Skin is warm. Coloration: Skin is not jaundiced. Findings: No erythema. Neurological:      Mental Status: He is alert and oriented to person, place, and time. Sensory: No sensory deficit. Motor: No weakness. Psychiatric:         Mood and Affect: Mood normal.         Behavior: Behavior normal.          Procedures       MDM  Number of Diagnoses or Management Options  Dehydration  Nausea vomiting and diarrhea  Diagnosis management comments: 72-year-old male presented emergency department with complaints of emesis, diarrhea, x3 weeks. Patient sent in by Dr. Rolo David for blood work, CT, stool study, C. difficile study. Minimal elevation of white blood cell count. Patient denies any urinary symptoms at this time. CT demonstrates findings consistent with gastroenteritis, no evidence of obstruction. Patient given Zofran, GI cocktail, fluids. Reports improvement of symptomatology. Spoke to Dr. Rolo David, discussed today's results, recommend patient receive empiric  Cipro Flagyl follow-up outpatient.        Amount and/or Complexity of Data Reviewed  Decide to obtain previous medical records or to obtain history from someone other than the patient: yes         ED Course as of 06/25/22 0057   Fri Jun 24, 2022   6480 Spoke to Dr. Sangita Samayoa, discussed patient, recommend Cipro flagyl empirically week with planned followup with Dr. Sangita Samayoa. Dr. Sangita Samayoa will speak to patient and call back with any change to plan. [SHAJI]   [phone] Spoke to Dr. Sangita Samayoa, will proceed with plan as discussed. [SHAJI]      ED Course User Index  [SHAJI] Naty Hatch DO        ED Course as of 06/25/22 0057 Fri Jun 24, 2022   0817 Spoke to Dr. Sangita Samayoa, discussed patient, recommend Cipro flagyl empirically week with planned followup with Dr. Sangita Samayoa. Dr. Sangita Samayoa will speak to patient and call back with any change to plan. [SHAJI]   [phone] Spoke to Dr. Sangita Samayoa, will proceed with plan as discussed. [SHAJI]      ED Course User Index  [SHAJI] Naty Hatch DO       --------------------------------------------- PAST HISTORY ---------------------------------------------  Past Medical History:  has a past medical history of CAD in native artery, H/O cardiovascular stress test, Heartburn, and Hypertension. Past Surgical History:  has a past surgical history that includes hernia repair and Coronary angioplasty with stent (11/16/2020). Social History:  reports that he has never smoked. He has never used smokeless tobacco. He reports previous alcohol use. He reports that he does not use drugs. Family History: family history includes COPD in his father; Coronary Art Dis in his maternal grandfather; Emphysema in his father. The patients home medications have been reviewed.     Allergies: Cozaar [losartan] and Lipitor [atorvastatin]    -------------------------------------------------- RESULTS -------------------------------------------------  Labs:  Results for orders placed or performed during the hospital encounter of 06/24/22   CBC with Auto Differential   Result Value Ref Range    WBC 11.6 (H) 4.5 - 11.5 E9/L    RBC 5.67 3.80 - 5.80 E12/L    Hemoglobin 17.3 (H) 12.5 - 16.5 g/dL    Hematocrit 50.6 37.0 - 54.0 %    MCV 89.2 80.0 - 99.9 fL    MCH 30.5 26.0 - 35.0 pg    MCHC 34.2 32.0 - 34.5 %    RDW 12.9 11.5 - 15.0 fL    Platelets 778 128 - 021 E9/L    MPV 9.9 7.0 - 12.0 fL    Neutrophils % 85.6 (H) 43.0 - 80.0 %    Immature Granulocytes % 0.3 0.0 - 5.0 %    Lymphocytes % 5.4 (L) 20.0 - 42.0 %    Monocytes % 7.1 2.0 - 12.0 %    Eosinophils % 1.1 0.0 - 6.0 %    Basophils % 0.5 0.0 - 2.0 %    Neutrophils Absolute 9.92 (H) 1.80 - 7.30 E9/L    Immature Granulocytes # 0.03 E9/L    Lymphocytes Absolute 0.63 (L) 1.50 - 4.00 E9/L    Monocytes Absolute 0.82 0.10 - 0.95 E9/L    Eosinophils Absolute 0.13 0.05 - 0.50 E9/L    Basophils Absolute 0.06 0.00 - 0.20 K6/C   Basic Metabolic Panel w/ Reflex to MG   Result Value Ref Range    Sodium 139 132 - 146 mmol/L    Potassium reflex Magnesium 4.7 3.5 - 5.0 mmol/L    Chloride 101 98 - 107 mmol/L    CO2 23 22 - 29 mmol/L    Anion Gap 15 7 - 16 mmol/L    Glucose 133 (H) 74 - 99 mg/dL    BUN 17 6 - 23 mg/dL    CREATININE 1.2 0.7 - 1.2 mg/dL    GFR Non-African American >60 >=60 mL/min/1.73    GFR African American >60     Calcium 10.1 8.6 - 10.2 mg/dL   Hepatic Function Panel   Result Value Ref Range    Total Protein 7.6 6.4 - 8.3 g/dL    Albumin 4.8 3.5 - 5.2 g/dL    Alkaline Phosphatase 132 (H) 40 - 129 U/L    ALT 47 (H) 0 - 40 U/L    AST 36 0 - 39 U/L    Total Bilirubin 0.6 0.0 - 1.2 mg/dL    Bilirubin, Direct <0.2 0.0 - 0.3 mg/dL    Bilirubin, Indirect see below 0.0 - 1.0 mg/dL   Lactic Acid   Result Value Ref Range    Lactic Acid 2.0 0.5 - 2.2 mmol/L   Lipase   Result Value Ref Range    Lipase 48 13 - 60 U/L   Urinalysis with Microscopic   Result Value Ref Range    Color, UA Yellow Straw/Yellow    Clarity, UA Clear Clear    Glucose, Ur Negative Negative mg/dL    Bilirubin Urine SMALL (A) Negative    Ketones, Urine 15 (A) Negative mg/dL    Specific Gravity, UA 1.010 1.005 - 1.030    Blood, Urine Negative Negative    pH, UA 6.0 5.0 - 9.0    Protein, UA Negative Negative mg/dL    Urobilinogen, Urine 0.2 <2.0 E.U./dL    Nitrite, Urine POSITIVE (A) Negative    Leukocyte Esterase, Urine TRACE (A) Negative    Mucus, UA Present (A) None Seen /LPF    WBC, UA 1-3 0 - 5 /HPF    RBC, UA NONE 0 - 2 /HPF    Bacteria, UA RARE (A) None Seen /HPF       Radiology:  CT ABDOMEN PELVIS W IV CONTRAST Additional Contrast? None   Final Result   1. There is fluid within large and small bowel which could be secondary to   gastroenteritis. No evidence of obstruction. 2. Possible small hiatal hernia.             ------------------------- NURSING NOTES AND VITALS REVIEWED ---------------------------  Date / Time Roomed:  6/24/2022 10:46 AM  ED Bed Assignment:  22/22    The nursing notes within the ED encounter and vital signs as below have been reviewed. /88   Pulse 75   Temp 97.3 °F (36.3 °C) (Oral)   Resp 16   Ht 6' (1.829 m)   Wt 175 lb (79.4 kg)   SpO2 99%   BMI 23.73 kg/m²   Oxygen Saturation Interpretation: Normal      ------------------------------------------ PROGRESS NOTES ------------------------------------------  I have spoken with the patient and discussed todays results, in addition to providing specific details for the plan of care and counseling regarding the diagnosis and prognosis. Their questions are answered at this time and they are agreeable with the plan. I discussed at length with them reasons for immediate return here for re evaluation. They will followup with primary care by calling their office tomorrow. --------------------------------- ADDITIONAL PROVIDER NOTES ---------------------------------  At this time the patient is without objective evidence of an acute process requiring hospitalization or inpatient management. They have remained hemodynamically stable throughout their entire ED visit and are stable for discharge with outpatient follow-up. The plan has been discussed in detail and they are aware of the specific conditions for emergent return, as well as the importance of follow-up.       Discharge Medication List as of 6/24/2022  3:28 PM      START taking these medications    Details   ciprofloxacin (CIPRO) 500 MG tablet Take 1 tablet by mouth 2 times daily for 7 days, Disp-14 tablet, R-0Print      metroNIDAZOLE (FLAGYL) 500 MG tablet Take 1 tablet by mouth 3 times daily for 7 days, Disp-21 tablet, R-0Print             Diagnosis:  1. Nausea vomiting and diarrhea    2. Dehydration        Disposition:  Patient's disposition: Discharge to home  Patient's condition is stable. Attending was present and available throughout encounter including all critical portions;  See Attending Note/Attestation for Final [address] Mainor Rogers DO  Resident  06/25/22 7352

## 2022-06-25 LAB — C DIFF TOXIN/ANTIGEN: NORMAL

## 2022-06-25 ASSESSMENT — ENCOUNTER SYMPTOMS
SHORTNESS OF BREATH: 0
VOMITING: 1
TROUBLE SWALLOWING: 0
WHEEZING: 0
VOICE CHANGE: 0
RHINORRHEA: 0
PHOTOPHOBIA: 0
ABDOMINAL PAIN: 0
COUGH: 0
DIARRHEA: 1
NAUSEA: 1

## 2022-06-26 LAB — CULTURE, STOOL: NORMAL

## 2022-08-03 ENCOUNTER — APPOINTMENT (OUTPATIENT)
Dept: CT IMAGING | Age: 67
End: 2022-08-03

## 2022-08-03 ENCOUNTER — HOSPITAL ENCOUNTER (OUTPATIENT)
Age: 67
Setting detail: OBSERVATION
Discharge: HOME | End: 2022-08-04
Attending: EMERGENCY MEDICINE
Payer: COMMERCIAL

## 2022-08-03 DIAGNOSIS — R07.9 CHEST PAIN, UNSPECIFIED TYPE: Primary | ICD-10-CM

## 2022-08-03 DIAGNOSIS — K29.00 ACUTE SUPERFICIAL GASTRITIS WITHOUT HEMORRHAGE: ICD-10-CM

## 2022-08-03 LAB
ALBUMIN SERPL-MCNC: 4.4 G/DL (ref 3.5–5.2)
ALP BLD-CCNC: 132 U/L (ref 40–129)
ALT SERPL-CCNC: 38 U/L (ref 0–40)
ANION GAP SERPL CALCULATED.3IONS-SCNC: 16 MMOL/L (ref 7–16)
AST SERPL-CCNC: 25 U/L (ref 0–39)
BASOPHILS ABSOLUTE: 0.05 E9/L (ref 0–0.2)
BASOPHILS RELATIVE PERCENT: 0.8 % (ref 0–2)
BILIRUB SERPL-MCNC: 0.4 MG/DL (ref 0–1.2)
BUN BLDV-MCNC: 21 MG/DL (ref 6–23)
CALCIUM SERPL-MCNC: 9.6 MG/DL (ref 8.6–10.2)
CHLORIDE BLD-SCNC: 105 MMOL/L (ref 98–107)
CO2: 21 MMOL/L (ref 22–29)
CREAT SERPL-MCNC: 1.1 MG/DL (ref 0.7–1.2)
EKG ATRIAL RATE: 45 BPM
EKG P AXIS: -6 DEGREES
EKG P-R INTERVAL: 132 MS
EKG Q-T INTERVAL: 460 MS
EKG QRS DURATION: 98 MS
EKG QTC CALCULATION (BAZETT): 397 MS
EKG R AXIS: -15 DEGREES
EKG T AXIS: 32 DEGREES
EKG VENTRICULAR RATE: 45 BPM
EOSINOPHILS ABSOLUTE: 0.09 E9/L (ref 0.05–0.5)
EOSINOPHILS RELATIVE PERCENT: 1.4 % (ref 0–6)
GFR AFRICAN AMERICAN: >60
GFR NON-AFRICAN AMERICAN: >60 ML/MIN/1.73
GLUCOSE BLD-MCNC: 175 MG/DL (ref 74–99)
HCT VFR BLD CALC: 45.4 % (ref 37–54)
HEMOGLOBIN: 15.4 G/DL (ref 12.5–16.5)
IMMATURE GRANULOCYTES #: 0.06 E9/L
IMMATURE GRANULOCYTES %: 1 % (ref 0–5)
LACTIC ACID: 2.2 MMOL/L (ref 0.5–2.2)
LYMPHOCYTES ABSOLUTE: 1.82 E9/L (ref 1.5–4)
LYMPHOCYTES RELATIVE PERCENT: 29 % (ref 20–42)
MCH RBC QN AUTO: 31 PG (ref 26–35)
MCHC RBC AUTO-ENTMCNC: 33.9 % (ref 32–34.5)
MCV RBC AUTO: 91.5 FL (ref 80–99.9)
MONOCYTES ABSOLUTE: 0.82 E9/L (ref 0.1–0.95)
MONOCYTES RELATIVE PERCENT: 13.1 % (ref 2–12)
NEUTROPHILS ABSOLUTE: 3.44 E9/L (ref 1.8–7.3)
NEUTROPHILS RELATIVE PERCENT: 54.7 % (ref 43–80)
PDW BLD-RTO: 13.3 FL (ref 11.5–15)
PLATELET # BLD: 225 E9/L (ref 130–450)
PMV BLD AUTO: 10.1 FL (ref 7–12)
POTASSIUM SERPL-SCNC: 3.9 MMOL/L (ref 3.5–5)
RBC # BLD: 4.96 E12/L (ref 3.8–5.8)
SODIUM BLD-SCNC: 142 MMOL/L (ref 132–146)
TOTAL PROTEIN: 6.7 G/DL (ref 6.4–8.3)
TROPONIN, HIGH SENSITIVITY: 11 NG/L (ref 0–11)
TROPONIN, HIGH SENSITIVITY: 11 NG/L (ref 0–11)
WBC # BLD: 6.3 E9/L (ref 4.5–11.5)

## 2022-08-03 PROCEDURE — A4216 STERILE WATER/SALINE, 10 ML: HCPCS | Performed by: EMERGENCY MEDICINE

## 2022-08-03 PROCEDURE — A4216 STERILE WATER/SALINE, 10 ML: HCPCS | Performed by: STUDENT IN AN ORGANIZED HEALTH CARE EDUCATION/TRAINING PROGRAM

## 2022-08-03 PROCEDURE — 6370000000 HC RX 637 (ALT 250 FOR IP): Performed by: STUDENT IN AN ORGANIZED HEALTH CARE EDUCATION/TRAINING PROGRAM

## 2022-08-03 PROCEDURE — 6360000004 HC RX CONTRAST MEDICATION: Performed by: RADIOLOGY

## 2022-08-03 PROCEDURE — 6370000000 HC RX 637 (ALT 250 FOR IP)

## 2022-08-03 PROCEDURE — 96374 THER/PROPH/DIAG INJ IV PUSH: CPT

## 2022-08-03 PROCEDURE — 80053 COMPREHEN METABOLIC PANEL: CPT

## 2022-08-03 PROCEDURE — G0378 HOSPITAL OBSERVATION PER HR: HCPCS

## 2022-08-03 PROCEDURE — 96375 TX/PRO/DX INJ NEW DRUG ADDON: CPT

## 2022-08-03 PROCEDURE — C9113 INJ PANTOPRAZOLE SODIUM, VIA: HCPCS | Performed by: STUDENT IN AN ORGANIZED HEALTH CARE EDUCATION/TRAINING PROGRAM

## 2022-08-03 PROCEDURE — 2500000003 HC RX 250 WO HCPCS

## 2022-08-03 PROCEDURE — 6360000002 HC RX W HCPCS: Performed by: STUDENT IN AN ORGANIZED HEALTH CARE EDUCATION/TRAINING PROGRAM

## 2022-08-03 PROCEDURE — 6370000000 HC RX 637 (ALT 250 FOR IP): Performed by: EMERGENCY MEDICINE

## 2022-08-03 PROCEDURE — 99285 EMERGENCY DEPT VISIT HI MDM: CPT

## 2022-08-03 PROCEDURE — 2580000003 HC RX 258: Performed by: EMERGENCY MEDICINE

## 2022-08-03 PROCEDURE — 6360000002 HC RX W HCPCS: Performed by: EMERGENCY MEDICINE

## 2022-08-03 PROCEDURE — 71275 CT ANGIOGRAPHY CHEST: CPT

## 2022-08-03 PROCEDURE — 99215 OFFICE O/P EST HI 40 MIN: CPT | Performed by: INTERNAL MEDICINE

## 2022-08-03 PROCEDURE — C9113 INJ PANTOPRAZOLE SODIUM, VIA: HCPCS | Performed by: EMERGENCY MEDICINE

## 2022-08-03 PROCEDURE — 74174 CTA ABD&PLVS W/CONTRAST: CPT

## 2022-08-03 PROCEDURE — 83605 ASSAY OF LACTIC ACID: CPT

## 2022-08-03 PROCEDURE — 93005 ELECTROCARDIOGRAM TRACING: CPT | Performed by: EMERGENCY MEDICINE

## 2022-08-03 PROCEDURE — 96376 TX/PRO/DX INJ SAME DRUG ADON: CPT

## 2022-08-03 PROCEDURE — 2580000003 HC RX 258: Performed by: STUDENT IN AN ORGANIZED HEALTH CARE EDUCATION/TRAINING PROGRAM

## 2022-08-03 PROCEDURE — 84484 ASSAY OF TROPONIN QUANT: CPT

## 2022-08-03 PROCEDURE — A4216 STERILE WATER/SALINE, 10 ML: HCPCS

## 2022-08-03 PROCEDURE — 85025 COMPLETE CBC W/AUTO DIFF WBC: CPT

## 2022-08-03 PROCEDURE — 2580000003 HC RX 258

## 2022-08-03 RX ORDER — ONDANSETRON 2 MG/ML
4 INJECTION INTRAMUSCULAR; INTRAVENOUS ONCE
Status: COMPLETED | OUTPATIENT
Start: 2022-08-03 | End: 2022-08-03

## 2022-08-03 RX ORDER — ONDANSETRON 2 MG/ML
4 INJECTION INTRAMUSCULAR; INTRAVENOUS EVERY 6 HOURS PRN
Status: DISCONTINUED | OUTPATIENT
Start: 2022-08-03 | End: 2022-08-04 | Stop reason: HOSPADM

## 2022-08-03 RX ORDER — SUCRALFATE 1 G/1
1 TABLET ORAL EVERY 6 HOURS SCHEDULED
Status: DISCONTINUED | OUTPATIENT
Start: 2022-08-04 | End: 2022-08-04 | Stop reason: HOSPADM

## 2022-08-03 RX ORDER — ONDANSETRON 4 MG/1
4 TABLET, ORALLY DISINTEGRATING ORAL EVERY 8 HOURS PRN
Status: DISCONTINUED | OUTPATIENT
Start: 2022-08-03 | End: 2022-08-04 | Stop reason: HOSPADM

## 2022-08-03 RX ORDER — NITROGLYCERIN 0.4 MG/1
0.4 TABLET SUBLINGUAL ONCE
Status: COMPLETED | OUTPATIENT
Start: 2022-08-03 | End: 2022-08-03

## 2022-08-03 RX ORDER — ASPIRIN 81 MG/1
81 TABLET, CHEWABLE ORAL DAILY
Status: DISCONTINUED | OUTPATIENT
Start: 2022-08-04 | End: 2022-08-04 | Stop reason: HOSPADM

## 2022-08-03 RX ORDER — PANTOPRAZOLE SODIUM 40 MG/1
40 TABLET, DELAYED RELEASE ORAL
Status: DISCONTINUED | OUTPATIENT
Start: 2022-08-04 | End: 2022-08-03

## 2022-08-03 RX ORDER — ACETAMINOPHEN 325 MG/1
650 TABLET ORAL EVERY 6 HOURS PRN
Status: DISCONTINUED | OUTPATIENT
Start: 2022-08-03 | End: 2022-08-04 | Stop reason: HOSPADM

## 2022-08-03 RX ORDER — ENOXAPARIN SODIUM 100 MG/ML
40 INJECTION SUBCUTANEOUS DAILY
Status: DISCONTINUED | OUTPATIENT
Start: 2022-08-03 | End: 2022-08-04 | Stop reason: HOSPADM

## 2022-08-03 RX ORDER — ACETAMINOPHEN 650 MG/1
650 SUPPOSITORY RECTAL EVERY 6 HOURS PRN
Status: DISCONTINUED | OUTPATIENT
Start: 2022-08-03 | End: 2022-08-04 | Stop reason: HOSPADM

## 2022-08-03 RX ORDER — SODIUM CHLORIDE 0.9 % (FLUSH) 0.9 %
5-40 SYRINGE (ML) INJECTION PRN
Status: DISCONTINUED | OUTPATIENT
Start: 2022-08-03 | End: 2022-08-04 | Stop reason: HOSPADM

## 2022-08-03 RX ORDER — SODIUM CHLORIDE 0.9 % (FLUSH) 0.9 %
5-40 SYRINGE (ML) INJECTION EVERY 12 HOURS SCHEDULED
Status: DISCONTINUED | OUTPATIENT
Start: 2022-08-03 | End: 2022-08-04 | Stop reason: HOSPADM

## 2022-08-03 RX ORDER — MORPHINE SULFATE 2 MG/ML
2 INJECTION, SOLUTION INTRAMUSCULAR; INTRAVENOUS EVERY 4 HOURS PRN
Status: DISCONTINUED | OUTPATIENT
Start: 2022-08-03 | End: 2022-08-04 | Stop reason: HOSPADM

## 2022-08-03 RX ORDER — MAGNESIUM HYDROXIDE/ALUMINUM HYDROXICE/SIMETHICONE 120; 1200; 1200 MG/30ML; MG/30ML; MG/30ML
30 SUSPENSION ORAL EVERY 6 HOURS PRN
Status: DISCONTINUED | OUTPATIENT
Start: 2022-08-03 | End: 2022-08-04 | Stop reason: HOSPADM

## 2022-08-03 RX ORDER — POLYETHYLENE GLYCOL 3350 17 G/17G
17 POWDER, FOR SOLUTION ORAL DAILY PRN
Status: DISCONTINUED | OUTPATIENT
Start: 2022-08-03 | End: 2022-08-04 | Stop reason: HOSPADM

## 2022-08-03 RX ORDER — SODIUM CHLORIDE 9 MG/ML
INJECTION, SOLUTION INTRAVENOUS PRN
Status: DISCONTINUED | OUTPATIENT
Start: 2022-08-03 | End: 2022-08-04 | Stop reason: HOSPADM

## 2022-08-03 RX ORDER — NITROGLYCERIN 0.4 MG/1
0.4 TABLET SUBLINGUAL EVERY 5 MIN PRN
Status: DISCONTINUED | OUTPATIENT
Start: 2022-08-03 | End: 2022-08-04 | Stop reason: HOSPADM

## 2022-08-03 RX ADMIN — SODIUM CHLORIDE 40 MG: 9 INJECTION, SOLUTION INTRAMUSCULAR; INTRAVENOUS; SUBCUTANEOUS at 20:15

## 2022-08-03 RX ADMIN — IOPAMIDOL 90 ML: 755 INJECTION, SOLUTION INTRAVENOUS at 10:01

## 2022-08-03 RX ADMIN — FAMOTIDINE 20 MG: 10 INJECTION INTRAVENOUS at 10:51

## 2022-08-03 RX ADMIN — ACETAMINOPHEN 650 MG: 325 TABLET, FILM COATED ORAL at 18:23

## 2022-08-03 RX ADMIN — SODIUM CHLORIDE 40 MG: 9 INJECTION, SOLUTION INTRAMUSCULAR; INTRAVENOUS; SUBCUTANEOUS at 13:48

## 2022-08-03 RX ADMIN — ONDANSETRON HYDROCHLORIDE 4 MG: 2 SOLUTION INTRAMUSCULAR; INTRAVENOUS at 11:50

## 2022-08-03 RX ADMIN — ALUMINUM HYDROXIDE, MAGNESIUM HYDROXIDE, AND SIMETHICONE: 200; 200; 20 SUSPENSION ORAL at 11:53

## 2022-08-03 RX ADMIN — NITROGLYCERIN 0.4 MG: 0.4 TABLET, ORALLY DISINTEGRATING SUBLINGUAL at 11:42

## 2022-08-03 RX ADMIN — NITROGLYCERIN 0.4 MG: 0.4 TABLET, ORALLY DISINTEGRATING SUBLINGUAL at 09:49

## 2022-08-03 ASSESSMENT — ENCOUNTER SYMPTOMS
VOMITING: 0
SHORTNESS OF BREATH: 1
CHEST TIGHTNESS: 1
RHINORRHEA: 0
BACK PAIN: 1
PHOTOPHOBIA: 0
DIARRHEA: 0
NAUSEA: 1
ABDOMINAL PAIN: 1
COLOR CHANGE: 0

## 2022-08-03 ASSESSMENT — PAIN SCALES - GENERAL
PAINLEVEL_OUTOF10: 0
PAINLEVEL_OUTOF10: 0
PAINLEVEL_OUTOF10: 6

## 2022-08-03 ASSESSMENT — PAIN - FUNCTIONAL ASSESSMENT: PAIN_FUNCTIONAL_ASSESSMENT: NONE - DENIES PAIN

## 2022-08-03 NOTE — ED NOTES
Name: Juan Hylton  : 1955  MRN: [id number]    Date: 8/3/2022    Benefits of immediately proceeding with Radiology exam outweigh the risks and therefore the following is being waived:      [] Pregnancy test    [] Protocol for Iodine allergy    [] MRI questionnaire    [x] BUN/Creatinine        MD Any Driver MD  [phone]

## 2022-08-03 NOTE — CONSULTS
Daria Pickens  1955  Date of Service: 8/3/2022    Reason for Consultation: We were asked to see Daria Pickens by Dr. Hillis Landau, DO  regarding chest pain. History of Chief Complaint: This is a 61 y.o. male known to our group through Dr. Devonte Chappell and the [place] [facility]. They have a history of coronary artery disease, intolerant of statins, hypertension, and colitis. He states that he was sick with colitis 1 month ago. He states that he was sick for approximately 3 weeks with this and has been placed on steroids for this. He states that this morning he was feeling well and then stood up and developed a tightness circumferentially around his chest.  This was associated with nausea, diaphoresis, and numbness in his face. He felt like he was going to pass out or become lightheaded. He states that he did vomit in the emergency room and that significantly improved his symptoms. He was also given a GI cocktail and nitroglycerin and his symptoms improved more. He still continues to feel slightly abnormal throughout the remainder of the day. He denies any dyspnea on exertion, orthopnea/PND. He denies any palpitations. REVIEW OF SYSTEMS:   Heart: as above   Lungs: as above   Eyes: denies changes in vision or discharge. Ears: denies changes in hearing or pain. Nose: denies epistaxis or masses   Throat: denies sore throat or trouble swallowing. Neuro: denies numbness, tingling, tremors. Skin: denies rashes or itching. : denies hematuria, dysuria   GI: denies vomiting, diarrhea   Psych: denies mood changed, anxiety, depression.     CURRENT MEDICATIONS:  Current Facility-Administered Medications   Medication Dose Route Frequency Provider Last Rate Last Admin    sodium chloride flush 0.9 % injection 5-40 mL  5-40 mL IntraVENous 2 times per day Gigi Enamorado MD        sodium chloride flush 0.9 % injection 5-40 mL  5-40 mL IntraVENous PRN Gigi Enamorado MD        0.9 % sodium chloride infusion   IntraVENous PRN Kirsten Sandoval MD        ondansetron (ZOFRAN-ODT) disintegrating tablet 4 mg  4 mg Oral Q8H PRN Kirsten Sandoval MD        Or    ondansetron [facility]) injection 4 mg  4 mg IntraVENous Q6H PRN Kirsten Sandoval MD        acetaminophen (TYLENOL) tablet 650 mg  650 mg Oral Q6H PRN Kirsten Sandoval MD   650 mg at 08/03/22 1823    Or    acetaminophen (TYLENOL) suppository 650 mg  650 mg Rectal Q6H PRN Kirsten Sandoval MD        polyethylene glycol [facility]) packet 17 g  17 g Oral Daily PRN Kirsten Sandoval MD        [START ON 8/4/2022] aspirin chewable tablet 81 mg  81 mg Oral Daily Kirsten Sandoval MD        nitroGLYCERIN (NITROSTAT) SL tablet 0.4 mg  0.4 mg SubLINGual Q5 Min PRN Kirsten Sandoval MD        enoxaparin (LOVENOX) injection 40 mg  40 mg SubCUTAneous Daily Kirsten Sandoval MD        aluminum & magnesium hydroxide-simethicone (MAALOX) 702-548-18 MG/5ML suspension 30 mL  30 mL Oral Q6H PRN Kirsten Sandoval MD        regadenoson [facility]) injection 0.4 mg  0.4 mg IntraVENous ONCE PRN Jeaneth Goes,         pantoprazole (PROTONIX) 40 mg in sodium chloride (PF) 10 mL injection  40 mg IntraVENous BID Kirsten Sandoval MD        morphine (PF) injection 2 mg  2 mg IntraVENous Q4H PRN Kirsten Sandoval MD            ALLERGIES:  Allergies   Allergen Reactions    Cozaar [Losartan] Other (See Comments)     Dizziness, lightheaded, jittery    Lipitor [Atorvastatin] Other (See Comments)     Muscle pain       MEDICAL HISTORY:  Past Medical History:   Diagnosis Date    CAD in native artery 11/16/2020    H/O cardiovascular stress test 05/16/2018    lexiscan    Heartburn     Hypertension        SURGICAL HISTORY:  Past Surgical History:   Procedure Laterality Date    CORONARY ANGIOPLASTY WITH STENT PLACEMENT  11/16/2020    2.25 x 22 Taras, 2.0 x12 Taras to the R-PLD and a 3.0 x 18 Taras to the Mid RCA by Dr. Nereyda Subramanian REPAIR         FAMILY HISTORY:  Family History   Problem Relation Age of Onset    COPD Father     Emphysema Father     Coronary Art Dis Maternal Grandfather        SOCIAL HISTORY:  Social History     Socioeconomic History    Marital status:    Tobacco Use    Smoking status: Never    Smokeless tobacco: Never   Vaping Use    Vaping Use: Never used   Substance and Sexual Activity    Alcohol use: Not Currently     Comment: rarely    Drug use: No     Social Determinants of Health     Financial Resource Strain: Low Risk     Difficulty of Paying Living Expenses: Not hard at all   Food Insecurity: No Food Insecurity    Worried About [employer] [employer] in the Last Year: Never true    [employer] in the Last Year: Never true       PHYSICAL EXAM:  Vitals:    08/03/22 1131 08/03/22 1216 08/03/22 1350 08/03/22 1457   BP: (!) 155/87 137/86 (!) 170/103 (!) 151/95   Pulse: (!) 48 (!) 45 50 59   Resp: 18 16 16 20   Temp:    98.3 °F (36.8 °C)   TempSrc:    Temporal   SpO2: 96% 95% 95% 96%   Weight:    178 lb 12.8 oz (81.1 kg)   Height:    6' (1.829 m)       GENERAL:  He is alert and oriented x 3, communicates well, in no distress. NECK:  No masses, trachea is mid position. Supple, full ROM, no JVD or bruits. No palpable thyromegaly or lymphadenopathy. HEART:  Regular rate and rhythm. Normal S1 and S2. There are no abnormal murmurs. No heaves. LUNGS:  Clear to auscultation bilaterally. No use of accessory muscles. ABDOMEN:  Soft, non-tender. Normal bowel sounds. EXTREMITIES:  Full ROM x4. No bilateral lower extremity edema. Good distal pulses. EYES:  PERRL, normal lids & conjunctiva. No icterus. ENT: no external masses, no bleeding. Moist mucosa. Normal lips formation. NEURO: Full ROM x 4, EOMI, no tremors. SKIN:  Warm, dry and intact. Normal turgor, no petechia. Phych: alert & oriented x3. Normal  Judgement & insight. Currently not agitated or anxious.        EKG: Sinus bradycardia, 45 bpm, nl axis, nonspecific ST - T wave changes. Labs:  Recent Labs     08/03/22  0941   WBC 6.3   HGB 15.4   HCT 45.4   MCV 91.5        Recent Labs     08/03/22  0941      K 3.9      CO2 21*   BUN 21   CREATININE 1.1     No results for input(s): PROTIME, INR in the last 72 hours. No results for input(s): CKTOTAL, CKMB, CKMBINDEX, TROPONINI in the last 72 hours. No results for input(s): BNP in the last 72 hours. No results for input(s): CHOL, HDL, TRIG in the last 72 hours. Invalid input(s): CHOLHDLR, [place] [address]     08/03/22  0941 08/03/22  1049   TROPHS 11 11       Assessment:   Atypical chest discomfort that has been constant all day. Negative ECG and troponins for ischemia or MI. Coronary artery disease. Intolerant of statins and reportedly cannot afford PSK 9 inhibitors. Hypertension  Colitis which he states that he was very sick with for 3 weeks beginning 1 month ago. On steroid therapy now. Bradycardia      Recommendations:  May need to decrease the metoprolol and add losartan pending his further trend in his heart rate and blood pressure. The bradycardia may also be vagal from his nausea and vomiting and the hypertension may also be transient from his comorbidities. We will continue to follow. Lexiscan Cardiolite stress test.  Comorbidities per others. Okay for discharge with cardiology if the stress test is negative. Thank you for allowing me to participate in your patient's care. [address] [facility], [address] [facility]  Interventional Cardiology    Note: This report was completed using computerized voice recognition software. Every effort has been made to ensure accuracy, however; and invert and computerized transcription errors may be present.

## 2022-08-03 NOTE — ED NOTES
Pt states the Maalox helped his discomfort in his epigastric area a little but the discomfort is still there     Lucas Brito, REMY  08/03/22 [address], RN  08/03/22 1221

## 2022-08-03 NOTE — H&P
Hospitalist History & Physical      PCP: Vaibhav Montes DO    Date of Admission: 8/3/2022    Date of Service: Pt seen/examined on 8/3/2022 and is placed in Observation. Chief Complaint:  had concerns including Chest Pain (Mid sternal radiates to center of his back hx of stent placement. 5/10). History Of Present Illness:    Mr. Cristina Durant, a 61y.o. year old male  who  has a past medical history of CAD in native artery, H/O cardiovascular stress test, Heartburn, and Hypertension. Patient comes emergency department with a complaint of chest pain/tightness since 7:30 AM this morning, right across the chest and radiating into the back and also associated with epigastric pain mild nausea, shortness of breath or diaphoresis. Patient has been taking steroid for the past month for a flare of IBD. He also has some GERD. Prior history is hypertension and CAD with stent placement in 2020 was on Plavix before. ED vital signs temperature 98 BP 87/104 heart rate 52 RR 14  Labs with negative troponin x2, normal lactic acid, unremarkable CBC, unremarkable CMP except for CO2 21 glucose 175 and  CTA chest with contrast negative for PE, thoracic or abdominal aortic dissection or aneurysm. No pneumonia or pleural effusion  She was given nitroglycerin, GI cocktail with mild improvement in symptoms. His nausea still persist and he would like to have a stress test done tomorrow  Intermittent was called for admission        Past Medical History:   Diagnosis Date    CAD in native artery 11/16/2020    H/O cardiovascular stress test 05/16/2018    lexiscan    Heartburn     Hypertension        Past Surgical History:   Procedure Laterality Date    CORONARY ANGIOPLASTY WITH STENT PLACEMENT  11/16/2020    2.25 x 22 [place], 2.0 x12 [place] to the R-PLD and a 3.0 x 18 [place] to the Mid RCA by Dr. Bushra Gonzales         Prior to Admission medications    Medication Sig Start Date End Date Taking?  Authorizing Provider ezetimibe (ZETIA) 10 mg tablet TAKE 1 TABLET BY MOUTH EVERY DAY 6/1/22   Historical Provider, MD   pantoprazole (PROTONIX) 40 MG tablet TAKE ONE TABLET BY MOUTH EVERY MORNING before breakfast 6/13/22   Cm Harper MD   sucralfate (CARAFATE) 1 GM tablet Take 1 tablet by mouth 4 times daily for 10 days 2/23/22 3/5/22  Bijal Kapadia DO   ipratropium (ATROVENT) 0.06 % nasal spray 2 sprays by Each Nostril route 4 times daily  Patient not taking: Reported on 6/13/2022 12/3/21   Bijal Kapadia DO   metoprolol tartrate (LOPRESSOR) 50 MG tablet TAKE ONE TABLET BY MOUTH TWO TIMES A DAY 11/29/21   Paula Barth MD   amLODIPine (NORVASC) 5 MG tablet Take 1 tablet by mouth daily 11/1/21   Paula Barth MD   Ascorbic Acid (VITAMIN C PO) Take 3,000 mg by mouth daily  Patient not taking: Reported on 6/13/2022    Historical Provider, MD   aspirin 81 MG EC tablet Take 81 mg by mouth daily    Historical Provider, MD   Flaxseed, Linseed, (FLAX SEED OIL) 1000 MG CAPS Take 1,000 mg by mouth daily  Patient not taking: Reported on 6/13/2022    Historical Provider, MD   Cinnamon 500 MG CAPS Take 500 mg by mouth daily    Historical Provider, MD         Allergies:  Cozaar [losartan] and Lipitor [atorvastatin]    Social History:    TOBACCO:   reports that he has never smoked. He has never used smokeless tobacco.  ETOH:   reports that he does not currently use alcohol. Family History:    Reviewed in detail and negative for DM, CAD, Cancer, CVA. Positive as follows\"      Problem Relation Age of Onset    COPD Father     Emphysema Father     Coronary Art Dis Maternal Grandfather        REVIEW OF SYSTEMS:   Pertinent positives as noted in the HPI. All other systems reviewed and negative. PHYSICAL EXAM:  BP (!) 170/103   Pulse 50   Temp 98 °F (36.7 °C)   Resp 16   Ht 6' (1.829 m)   Wt 175 lb (79.4 kg)   SpO2 95%   BMI 23.73 kg/m²   General appearance: No apparent distress, appears stated age and cooperative.   HEENT: Normal cephalic, atraumatic without obvious deformity. Pupils equal, round, and reactive to light. Extra ocular muscles intact. Conjunctivae/corneas clear. Neck: Supple, with full range of motion. No jugular venous distention. Trachea midline. Respiratory: Normal respiratory effort, clear to auscultation bilaterally, no wheezing, rales or rhonchi  Cardiovascular: Regular heartbeat, no murmur gallops or rubs  Abdomen: Flat, positive BS, tender to palpation epigastrium  Musculoskeletal: No clubbing, cyanosis, edema of bilateral lower extremities. Brisk capillary refill. Skin: Normal skin color. No rashes or lesions. Neurologic:  Neurovascularly intact without any focal sensory/motor deficits. Cranial nerves: II-XII intact, grossly non-focal.  Psychiatric: Alert and oriented, thought content appropriate, normal insight    Reviewed EKG and CXR personally      CBC:   Recent Labs     08/03/22  0941   WBC 6.3   RBC 4.96   HGB 15.4   HCT 45.4   MCV 91.5   RDW 13.3        BMP:   Recent Labs     08/03/22  0941      K 3.9      CO2 21*   BUN 21   CREATININE 1.1     LFT:  Recent Labs     08/03/22  0941   PROT 6.7   ALKPHOS 132*   ALT 38   AST 25   BILITOT 0.4     CE:  No results for input(s): Gladys Gazella in the last 72 hours. PT/INR: No results for input(s): INR, APTT in the last 72 hours. BNP: No results for input(s): BNP in the last 72 hours.   ESR: No results found for: SEDRATE  CRP: No results found for: CRP  D Dimer: No results found for: DDIMER   Folate and B12: No results found for: APOHBUYD32, No results found for: FOLATE  Lactic Acid:   Lab Results   Component Value Date    LACTA 2.2 08/03/2022     Thyroid Studies:   Lab Results   Component Value Date    TSH 2.070 08/04/2020       Oupatient labs:  Lab Results   Component Value Date    CHOL 199 08/04/2020    TRIG 177 (H) 08/04/2020    HDL 39 08/04/2020    LDLCALC 125 (H) 08/04/2020    TSH 2.070 08/04/2020    PSA 0.54 08/04/2020    INR 0.9 11/13/2020    LABA1C 6.2 (H) 08/04/2020       Urinalysis:    Lab Results   Component Value Date/Time    NITRU POSITIVE 06/24/2022 11:24 AM    WBCUA 1-3 06/24/2022 11:24 AM    BACTERIA RARE 06/24/2022 11:24 AM    RBCUA NONE 06/24/2022 11:24 AM    BLOODU Negative 06/24/2022 11:24 AM    SPECGRAV 1.010 06/24/2022 11:24 AM    GLUCOSEU Negative 06/24/2022 11:24 AM       Imaging:  CTA CHEST W CONTRAST    Result Date: 8/3/2022  EXAMINATION: CTA OF THE CHEST; CTA OF THE ABDOMEN AND PELVIS WITH CONTRAST 8/3/2022 10:08 am TECHNIQUE: CTA of the chest was performed after the administration of intravenous contrast.  Multiplanar reformatted images are provided for review. MIP images are provided for review. Automated exposure control, iterative reconstruction, and/or weight based adjustment of the mA/kV was utilized to reduce the radiation dose to as low as reasonably achievable.; CTA of the abdomen and pelvis was performed with the administration of intravenous contrast. Multiplanar reformatted images are provided for review. MIP images are provided for review. Automated exposure control, iterative reconstruction, and/or weight based adjustment of the mA/kV was utilized to reduce the radiation dose to as low as reasonably achievable. COMPARISON: None. HISTORY: ORDERING SYSTEM PROVIDED HISTORY: R/O AORTIC DISSECTION TECHNOLOGIST PROVIDED HISTORY: Reason for exam:->R/O AORTIC DISSECTION Decision Support Exception - unselect if not a suspected or confirmed emergency medical condition->Emergency Medical Condition (MA) What reading provider will be dictating this exam?->CRC FINDINGS: Aorta: Ascending thoracic aorta measures 3.3 cm in diameter. Descending thoracic aorta measures 2.5 cm in diameter. Normal origin of brachiocephalic trunk, left common, and left subclavian arteries at thoracic aortic arch. No thoracic aortic aneurysm or dissection.   Calcified and noncalcified atherosclerotic plaque associated with the abdominal aorta.  No abdominal aortic aneurysm or dissection. Note made of penetrating atherosclerotic ulcer involving anterior margin of infrarenal abdominal aorta measuring 4 x 3 mm. Common iliac arteries are patent. External iliac arteries are patent. Mild stenosis at origin of celiac trunk. SMA is patent. No stenosis involving the renal arteries. CT chest: No airspace opacity or evidence of pleural effusion. No pneumothorax. The heart is normal in size. No pericardial effusion. No mediastinal lymphadenopathy. Small hiatal hernia. CT abdomen/pelvis: No bowel obstruction, free air, or free fluid. Numerous diverticula involving the left colon and sigmoid colon. No evidence of acute diverticulitis. The appendix is visualized and normal in the right lower quadrant. Liver is unremarkable. Gallbladder is unremarkable. No evidence of acute pancreatitis. Spleen is normal in size. No hydronephrosis. Nonobstructing 2 mm right renal calculus. Nonobstructing 1 mm left renal calculus. Moderate prostatomegaly. Prostate measures 4.2 x 5.4 cm. No compression fracture involving thoracic spine or lumbar spine. 1. No thoracic or abdominal aortic dissection or aneurysm. 2. Atherosclerotic plaque involving the infrarenal abdominal aorta with tiny penetrating atherosclerotic ulcer. 3. No pneumonia or pleural effusion. 4. No acute process in chest, abdomen, or pelvis. 5. Diverticulosis. 6. Nonobstructing right renal calculi. CTA ABDOMEN PELVIS W CONTRAST    Result Date: 8/3/2022  EXAMINATION: CTA OF THE CHEST; CTA OF THE ABDOMEN AND PELVIS WITH CONTRAST 8/3/2022 10:08 am TECHNIQUE: CTA of the chest was performed after the administration of intravenous contrast.  Multiplanar reformatted images are provided for review. MIP images are provided for review.  Automated exposure control, iterative reconstruction, and/or weight based adjustment of the mA/kV was utilized to reduce the radiation dose to as low as reasonably achievable.; CTA of the abdomen and pelvis was performed with the administration of intravenous contrast. Multiplanar reformatted images are provided for review. MIP images are provided for review. Automated exposure control, iterative reconstruction, and/or weight based adjustment of the mA/kV was utilized to reduce the radiation dose to as low as reasonably achievable. COMPARISON: None. HISTORY: ORDERING SYSTEM PROVIDED HISTORY: R/O AORTIC DISSECTION TECHNOLOGIST PROVIDED HISTORY: Reason for exam:->R/O AORTIC DISSECTION Decision Support Exception - unselect if not a suspected or confirmed emergency medical condition->Emergency Medical Condition (MA) What reading provider will be dictating this exam?->CRC FINDINGS: Aorta: Ascending thoracic aorta measures 3.3 cm in diameter. Descending thoracic aorta measures 2.5 cm in diameter. Normal origin of brachiocephalic trunk, left common, and left subclavian arteries at thoracic aortic arch. No thoracic aortic aneurysm or dissection. Calcified and noncalcified atherosclerotic plaque associated with the abdominal aorta. No abdominal aortic aneurysm or dissection. Note made of penetrating atherosclerotic ulcer involving anterior margin of infrarenal abdominal aorta measuring 4 x 3 mm. Common iliac arteries are patent. External iliac arteries are patent. Mild stenosis at origin of celiac trunk. SMA is patent. No stenosis involving the renal arteries. CT chest: No airspace opacity or evidence of pleural effusion. No pneumothorax. The heart is normal in size. No pericardial effusion. No mediastinal lymphadenopathy. Small hiatal hernia. CT abdomen/pelvis: No bowel obstruction, free air, or free fluid. Numerous diverticula involving the left colon and sigmoid colon. No evidence of acute diverticulitis. The appendix is visualized and normal in the right lower quadrant. Liver is unremarkable. Gallbladder is unremarkable. No evidence of acute pancreatitis. Spleen is normal in size. No hydronephrosis. Nonobstructing 2 mm right renal calculus. Nonobstructing 1 mm left renal calculus. Moderate prostatomegaly. Prostate measures 4.2 x 5.4 cm. No compression fracture involving thoracic spine or lumbar spine. 1. No thoracic or abdominal aortic dissection or aneurysm. 2. Atherosclerotic plaque involving the infrarenal abdominal aorta with tiny penetrating atherosclerotic ulcer. 3. No pneumonia or pleural effusion. 4. No acute process in chest, abdomen, or pelvis. 5. Diverticulosis. 6. Nonobstructing right renal calculi. ASSESSMENT:    Chest pain, rule out ACS. History of CAD status post stent 2020  GERD  IBD on steroids  Hypertension  Sinus bradycardia  Abdominal pain  Nausea and vomiting    PLAN:    -Continue cardiac monitoring  -IV morphine for severe pain  -Zofran for nausea and vomiting  -Nitroglycerin as needed for chest pain  -N.p.o. from midnight for stress test  -Continue pantoprazole 40 mg IV twice daily. Consult general surgery for possible scoping for suspected esophagitis/gastritis/PUD  -Follow cardiac recommendations. Stress test has been ordered and will be done tomorrow  -Risk factor modification  -Discharge to home when medically clear      Diet: Diet NPO  Code Status: Full Code  Surrogate decision maker confirmed with patient:   Extended Emergency Contact Information  Primary Emergency Contact: [address] [place] [address] Road of [address] [place] [address] Phone: [phone]  Relation: Spouse    DVT Prophylaxis: []Lovenox []Heparin []PCD [] [address] []Encouraged ambulation  Disposition: []Med/Surg [] Intermediate [] ICU/CCU  Admit status: [] Observation [] Inpatient     +++++++++++++++++++++++++++++++++++++++++++++++++  MD KRISS Cueva/ Maurice Rodriguez [address] [place] [address]  +++++++++++++++++++++++++++++++++++++++++++++++++  NOTE: This report was transcribed using voice recognition software. Every effort was made to ensure accuracy; however, inadvertent computerized transcription errors may be present.

## 2022-08-03 NOTE — ED NOTES
Pt had 2 episodes of emesis, Dr. Babita Godinez notified and pt given 4mg IV Stacy Bajwa RN  08/03/22 Trace Jeffries RN  08/03/22 1300

## 2022-08-03 NOTE — ED PROVIDER NOTES
HPI     Patient is a 61 y.o. male presents with a chief complaint of chest pain  This has been occurring since he woke up this morning around 7:30AM.  Patient states that nothing exacerbates his pain or has helped relieve it and complains of pain even at rest.   Patient states that it is severe in severity. Patient states it was acute in onset and gradually worsening. Patient states he woke up with this morning around 7:30AM with chest tightness and pain between his shoulder blades, as if he has worked out but denies any recent physical activity. States he is nauseous and having mild abdominal pain, unsure if it is related to heart burn. Admits to shortness of breath. States he has never had chest pain like this before that has lasted. Denies numbness/tingling to the extremities or radiation of the chest pain besides in between his shoulder blades. Denies any recent drug use or acute illnesses within the last week. Within the last month he was diagnosed with colitis and has been taking budesonide. Is compliant with his medications and denies missing any doses of current medications. S/p cardiac catheterization in 11/2020. Review of Systems   Constitutional:  Positive for diaphoresis. Negative for chills and fatigue. HENT:  Negative for congestion and rhinorrhea. Eyes:  Negative for photophobia and visual disturbance. Respiratory:  Positive for chest tightness and shortness of breath. Cardiovascular:  Positive for chest pain. Negative for palpitations and leg swelling. Gastrointestinal:  Positive for abdominal pain and nausea. Negative for diarrhea and vomiting. Genitourinary:  Negative for dysuria and hematuria. Musculoskeletal:  Positive for back pain. Negative for arthralgias and myalgias. Skin:  Negative for color change, rash and wound. Neurological:  Negative for dizziness, light-headedness and headaches.       Physical Exam  Constitutional:       Appearance: Normal appearance. He is normal weight. HENT:      Head: Normocephalic and atraumatic. Mouth/Throat:      Mouth: Mucous membranes are moist.      Pharynx: Oropharynx is clear. Eyes:      Conjunctiva/sclera: Conjunctivae normal.      Pupils: Pupils are equal, round, and reactive to light. Cardiovascular:      Rate and Rhythm: Normal rate and regular rhythm. Pulses: Normal pulses. Heart sounds: Normal heart sounds. Pulmonary:      Effort: Pulmonary effort is normal.      Breath sounds: Normal breath sounds. Abdominal:      General: Bowel sounds are normal. There is no distension. Palpations: Abdomen is soft. Musculoskeletal:         General: No swelling or tenderness. Normal range of motion. Skin:     General: Skin is warm and dry. Neurological:      General: No focal deficit present. Mental Status: He is alert and oriented to person, place, and time. Psychiatric:         Mood and Affect: Mood normal.         Behavior: Behavior normal.        Procedures     MDM       Patient is a 61 y.o. male presenting with acute chest pain. Worked up with CTA chest and abdomen/pelvis to r/o aortic dissection. Troponin was checked for chest pain and was given . 4mg nitro sublingual x2. Pepcid and GI cocktail was given for his abdominal discomfort and concern for questionable peptic ulcer. Cardiology was consulted. Patient will be admitted for further evaluation given his history. Patient was seen and evaluated by myself and my attending Claudeen Quarry, MD. Assessment and Plan discussed with attending provider, please see attestation for final plan of care. This note was done using dictation software and there may be some grammatical errors associated with this.     Heike Servin, DO          --------------------------------------------- PAST HISTORY ---------------------------------------------  Past Medical History:  has a past medical history of CAD in native artery, H/O Glucose 175 (H) 74 - 99 mg/dL    BUN 21 6 - 23 mg/dL    Creatinine 1.1 0.7 - 1.2 mg/dL    GFR Non-African American >60 >=60 mL/min/1.73    GFR African American >60     Calcium 9.6 8.6 - 10.2 mg/dL    Total Protein 6.7 6.4 - 8.3 g/dL    Albumin 4.4 3.5 - 5.2 g/dL    Total Bilirubin 0.4 0.0 - 1.2 mg/dL    Alkaline Phosphatase 132 (H) 40 - 129 U/L    ALT 38 0 - 40 U/L    AST 25 0 - 39 U/L   Troponin   Result Value Ref Range    Troponin, High Sensitivity 11 0 - 11 ng/L   Lactic Acid   Result Value Ref Range    Lactic Acid 2.2 0.5 - 2.2 mmol/L   EKG 12 Lead   Result Value Ref Range    Ventricular Rate 45 BPM    Atrial Rate 45 BPM    P-R Interval 132 ms    QRS Duration 98 ms    Q-T Interval 460 ms    QTc Calculation (Bazett) 397 ms    P Axis -6 degrees    R Axis -15 degrees    T Axis 32 degrees       RADIOLOGY:  CTA CHEST W CONTRAST   Final Result   1. No thoracic or abdominal aortic dissection or aneurysm. 2. Atherosclerotic plaque involving the infrarenal abdominal aorta with tiny   penetrating atherosclerotic ulcer. 3. No pneumonia or pleural effusion. 4. No acute process in chest, abdomen, or pelvis. 5. Diverticulosis. 6. Nonobstructing right renal calculi. CTA ABDOMEN PELVIS W CONTRAST   Final Result   1. No thoracic or abdominal aortic dissection or aneurysm. 2. Atherosclerotic plaque involving the infrarenal abdominal aorta with tiny   penetrating atherosclerotic ulcer. 3. No pneumonia or pleural effusion. 4. No acute process in chest, abdomen, or pelvis. 5. Diverticulosis. 6. Nonobstructing right renal calculi.                 ------------------------- NURSING NOTES AND VITALS REVIEWED ---------------------------  Date / Time Roomed:  8/3/2022  9:21 AM  ED Bed Assignment:  22/22    The nursing notes within the ED encounter and vital signs as below have been reviewed.      Patient Vitals for the past 24 hrs:   BP Temp Pulse Resp SpO2 Height Weight   08/03/22 1350 (!) 170/103 -- 50 16 95 % -- --   08/03/22 1216 137/86 -- (!) 45 16 95 % -- --   08/03/22 1131 (!) 155/87 -- (!) 48 18 96 % -- --   08/03/22 1011 (!) 158/72 -- 53 -- -- -- --   08/03/22 0925 (!) 187/104 98 °F (36.7 °C) 52 14 98 % 6' (1.829 m) 175 lb (79.4 kg)       Oxygen Saturation Interpretation: Normal    ------------------------------------------ PROGRESS NOTES ------------------------------------------  Re-evaluation(s):  Patients symptoms show mild improvement  Repeat physical examination is not changed    Counseling:  I have spoken with the patient and wife  and discussed todays results, in addition to providing specific details for the plan of care and counseling regarding the diagnosis and prognosis. Their questions are answered at this time and they are agreeable with the plan of admission.    --------------------------------- ADDITIONAL PROVIDER NOTES ---------------------------------  Consultations:  Spoke with Dr. Miller Pineda. Discussed case. They will admit the patient. This patient's ED course included: a personal history and physicial examination, re-evaluation prior to disposition, multiple bedside re-evaluations, IV medications, and cardiac monitoring. This patient has remained hemodynamically stable during their ED course. Diagnosis:  1. Chest pain, unspecified type    2.  Acute superficial gastritis without hemorrhage        Disposition:  Patient's disposition: Admit to observation  Patient's condition is Stable           Debra Clay DO  Resident  08/03/22 6018

## 2022-08-03 NOTE — PROGRESS NOTES
Patient was for a STAT stress test, patient is unable to receive the test due to dosages not being available. Message left with Will Gautam for cardiology for him to be added to the schedule for tomorrow. Patient is agreeable at this time for test to be done in morning.

## 2022-08-04 ENCOUNTER — APPOINTMENT (OUTPATIENT)
Dept: ULTRASOUND IMAGING | Age: 67
End: 2022-08-04

## 2022-08-04 ENCOUNTER — APPOINTMENT (OUTPATIENT)
Dept: NON INVASIVE DIAGNOSTICS | Age: 67
End: 2022-08-04

## 2022-08-04 ENCOUNTER — APPOINTMENT (OUTPATIENT)
Dept: NUCLEAR MEDICINE | Age: 67
End: 2022-08-04
Payer: COMMERCIAL

## 2022-08-04 VITALS
TEMPERATURE: 98.8 F | OXYGEN SATURATION: 96 % | DIASTOLIC BLOOD PRESSURE: 77 MMHG | WEIGHT: 178.6 LBS | HEIGHT: 72 IN | SYSTOLIC BLOOD PRESSURE: 109 MMHG | BODY MASS INDEX: 24.19 KG/M2 | HEART RATE: 62 BPM | RESPIRATION RATE: 18 BRPM

## 2022-08-04 LAB
ANION GAP SERPL CALCULATED.3IONS-SCNC: 12 MMOL/L (ref 7–16)
BUN BLDV-MCNC: 14 MG/DL (ref 6–23)
CALCIUM SERPL-MCNC: 9.3 MG/DL (ref 8.6–10.2)
CHLORIDE BLD-SCNC: 103 MMOL/L (ref 98–107)
CO2: 25 MMOL/L (ref 22–29)
CREAT SERPL-MCNC: 1 MG/DL (ref 0.7–1.2)
GFR AFRICAN AMERICAN: >60
GFR NON-AFRICAN AMERICAN: >60 ML/MIN/1.73
GLUCOSE BLD-MCNC: 129 MG/DL (ref 74–99)
HCT VFR BLD CALC: 46.4 % (ref 37–54)
HEMOGLOBIN: 16.1 G/DL (ref 12.5–16.5)
LV EF: 54 %
LVEF MODALITY: NORMAL
MAGNESIUM: 2.3 MG/DL (ref 1.6–2.6)
MCH RBC QN AUTO: 31.8 PG (ref 26–35)
MCHC RBC AUTO-ENTMCNC: 34.7 % (ref 32–34.5)
MCV RBC AUTO: 91.7 FL (ref 80–99.9)
PDW BLD-RTO: 13.6 FL (ref 11.5–15)
PLATELET # BLD: 227 E9/L (ref 130–450)
PMV BLD AUTO: 10.2 FL (ref 7–12)
POTASSIUM SERPL-SCNC: 4.2 MMOL/L (ref 3.5–5)
RBC # BLD: 5.06 E12/L (ref 3.8–5.8)
SODIUM BLD-SCNC: 140 MMOL/L (ref 132–146)
WBC # BLD: 7.8 E9/L (ref 4.5–11.5)

## 2022-08-04 PROCEDURE — C9113 INJ PANTOPRAZOLE SODIUM, VIA: HCPCS | Performed by: STUDENT IN AN ORGANIZED HEALTH CARE EDUCATION/TRAINING PROGRAM

## 2022-08-04 PROCEDURE — 2580000003 HC RX 258: Performed by: STUDENT IN AN ORGANIZED HEALTH CARE EDUCATION/TRAINING PROGRAM

## 2022-08-04 PROCEDURE — 93016 CV STRESS TEST SUPVJ ONLY: CPT | Performed by: INTERNAL MEDICINE

## 2022-08-04 PROCEDURE — 6360000002 HC RX W HCPCS: Performed by: INTERNAL MEDICINE

## 2022-08-04 PROCEDURE — 99219 PR INITIAL OBSERVATION CARE/DAY 50 MINUTES: CPT | Performed by: SURGERY

## 2022-08-04 PROCEDURE — 36415 COLL VENOUS BLD VENIPUNCTURE: CPT

## 2022-08-04 PROCEDURE — 80048 BASIC METABOLIC PNL TOTAL CA: CPT

## 2022-08-04 PROCEDURE — 3430000000 HC RX DIAGNOSTIC RADIOPHARMACEUTICAL: Performed by: RADIOLOGY

## 2022-08-04 PROCEDURE — 93018 CV STRESS TEST I&R ONLY: CPT | Performed by: INTERNAL MEDICINE

## 2022-08-04 PROCEDURE — 6370000000 HC RX 637 (ALT 250 FOR IP): Performed by: NURSE PRACTITIONER

## 2022-08-04 PROCEDURE — 6360000002 HC RX W HCPCS: Performed by: STUDENT IN AN ORGANIZED HEALTH CARE EDUCATION/TRAINING PROGRAM

## 2022-08-04 PROCEDURE — G0378 HOSPITAL OBSERVATION PER HR: HCPCS

## 2022-08-04 PROCEDURE — 78452 HT MUSCLE IMAGE SPECT MULT: CPT | Performed by: INTERNAL MEDICINE

## 2022-08-04 PROCEDURE — 6370000000 HC RX 637 (ALT 250 FOR IP): Performed by: STUDENT IN AN ORGANIZED HEALTH CARE EDUCATION/TRAINING PROGRAM

## 2022-08-04 PROCEDURE — A9500 TC99M SESTAMIBI: HCPCS | Performed by: RADIOLOGY

## 2022-08-04 PROCEDURE — 78452 HT MUSCLE IMAGE SPECT MULT: CPT

## 2022-08-04 PROCEDURE — 99212 OFFICE O/P EST SF 10 MIN: CPT | Performed by: INTERNAL MEDICINE

## 2022-08-04 PROCEDURE — 83735 ASSAY OF MAGNESIUM: CPT

## 2022-08-04 PROCEDURE — 76705 ECHO EXAM OF ABDOMEN: CPT

## 2022-08-04 PROCEDURE — 85027 COMPLETE CBC AUTOMATED: CPT

## 2022-08-04 PROCEDURE — A4216 STERILE WATER/SALINE, 10 ML: HCPCS | Performed by: STUDENT IN AN ORGANIZED HEALTH CARE EDUCATION/TRAINING PROGRAM

## 2022-08-04 PROCEDURE — 93017 CV STRESS TEST TRACING ONLY: CPT

## 2022-08-04 PROCEDURE — 96376 TX/PRO/DX INJ SAME DRUG ADON: CPT

## 2022-08-04 PROCEDURE — 96372 THER/PROPH/DIAG INJ SC/IM: CPT

## 2022-08-04 RX ORDER — PANTOPRAZOLE SODIUM 40 MG/1
TABLET, DELAYED RELEASE ORAL
Qty: 30 TABLET | Refills: 0 | Status: SHIPPED | OUTPATIENT
Start: 2022-08-04

## 2022-08-04 RX ORDER — PANTOPRAZOLE SODIUM 40 MG/1
TABLET, DELAYED RELEASE ORAL
Qty: 30 TABLET | Refills: 0 | Status: SHIPPED | OUTPATIENT
Start: 2022-08-04 | End: 2022-08-04 | Stop reason: SDUPTHER

## 2022-08-04 RX ORDER — SUCRALFATE 1 G/1
1 TABLET ORAL 4 TIMES DAILY
Qty: 120 TABLET | Refills: 0 | Status: SHIPPED | OUTPATIENT
Start: 2022-08-04 | End: 2022-08-04 | Stop reason: SDUPTHER

## 2022-08-04 RX ORDER — EZETIMIBE 10 MG/1
10 TABLET ORAL DAILY
Status: DISCONTINUED | OUTPATIENT
Start: 2022-08-04 | End: 2022-08-04 | Stop reason: HOSPADM

## 2022-08-04 RX ORDER — METOPROLOL SUCCINATE 50 MG/1
50 TABLET, EXTENDED RELEASE ORAL DAILY
Status: DISCONTINUED | OUTPATIENT
Start: 2022-08-04 | End: 2022-08-04 | Stop reason: HOSPADM

## 2022-08-04 RX ORDER — METOPROLOL TARTRATE 50 MG/1
50 TABLET, FILM COATED ORAL 2 TIMES DAILY
Status: DISCONTINUED | OUTPATIENT
Start: 2022-08-04 | End: 2022-08-04

## 2022-08-04 RX ORDER — SUCRALFATE 1 G/1
1 TABLET ORAL 4 TIMES DAILY
Qty: 120 TABLET | Refills: 0 | Status: SHIPPED | OUTPATIENT
Start: 2022-08-04 | End: 2022-09-03

## 2022-08-04 RX ORDER — METOPROLOL SUCCINATE 50 MG/1
50 TABLET, EXTENDED RELEASE ORAL DAILY
COMMUNITY

## 2022-08-04 RX ORDER — AMLODIPINE BESYLATE 5 MG/1
5 TABLET ORAL DAILY
Status: DISCONTINUED | OUTPATIENT
Start: 2022-08-04 | End: 2022-08-04 | Stop reason: HOSPADM

## 2022-08-04 RX ADMIN — REGADENOSON 0.4 MG: 0.08 INJECTION, SOLUTION INTRAVENOUS at 12:12

## 2022-08-04 RX ADMIN — ASPIRIN 81 MG: 81 TABLET, CHEWABLE ORAL at 07:58

## 2022-08-04 RX ADMIN — ACETAMINOPHEN 650 MG: 325 TABLET, FILM COATED ORAL at 12:22

## 2022-08-04 RX ADMIN — Medication 12 MILLICURIE: at 09:25

## 2022-08-04 RX ADMIN — AMLODIPINE BESYLATE 5 MG: 5 TABLET ORAL at 13:22

## 2022-08-04 RX ADMIN — ENOXAPARIN SODIUM 40 MG: 100 INJECTION SUBCUTANEOUS at 07:59

## 2022-08-04 RX ADMIN — Medication 38 MILLICURIE: at 13:02

## 2022-08-04 RX ADMIN — SODIUM CHLORIDE 40 MG: 9 INJECTION, SOLUTION INTRAMUSCULAR; INTRAVENOUS; SUBCUTANEOUS at 07:58

## 2022-08-04 ASSESSMENT — PAIN SCALES - GENERAL: PAINLEVEL_OUTOF10: 4

## 2022-08-04 ASSESSMENT — PAIN DESCRIPTION - DESCRIPTORS: DESCRIPTORS: ACHING;DISCOMFORT;NAGGING

## 2022-08-04 ASSESSMENT — PAIN - FUNCTIONAL ASSESSMENT: PAIN_FUNCTIONAL_ASSESSMENT: ACTIVITIES ARE NOT PREVENTED

## 2022-08-04 ASSESSMENT — PAIN DESCRIPTION - LOCATION: LOCATION: HEAD

## 2022-08-04 NOTE — CARE COORDINATION
8/4, Patient currently off floor for stress test.  Patient was admitted due to chest pain. Per past CM note patient was at hospital in 11/2020 due to chest pain and light headedness. Patient at that time was discharged to home. PCP is Dr. Juan Chen. No needs identified in rounds. Patient to discharge home when medically cleared. SW to follow for further discharge planning needs.       ESTELA Gracia  [facility] Case [facility]  [phone]

## 2022-08-04 NOTE — PROGRESS NOTES
Attending Attestation   Patient seen and examined, agree with resident note except for changes made by me, for remaining HP/Consult/progress note details please see resident HP/Consult/progress note. General Surgery Progress Note:    CC: abd pain     S: acute onset moderate to severe upper abd chest pain resolved spontaneously, no previous sx. Recent endoscopy by GI hx colitis. Objective:  @/77   Pulse 62   Temp 98.8 °F (37.1 °C) (Temporal)   Resp 18   Ht 6' (1.829 m)   Wt 178 lb 9.6 oz (81 kg)   SpO2 96%   BMI 24.22 kg/m² @    Physical -     Gen: NAD  HEENT PERRL conj pink  Resp: Breathing Comfortably, no resp distress clear b/l   CV: Reg Rate no extra heart sounds   Abd: SNT no masses   EXT rom wnl nvi    Assessment/Plan: epigastric/chest pain now resolved     RUQ US to r/o cholelithiasis  NO surgical plans   FU in office  Call surgery as needed        Gilson Flowers MD FACS   See d/c summary     9:53 AM                                                                                                                              GENERAL SURGERY  CONSULT NOTE  8/3/2022     Physician Consulted: Dr. Kymberly Carroll  Reason for Consult: epigastric pain  Referring Physician: Dr. Chicho RICE  Shayan Ames is a 61 y.o. male who presents for evaluation of epigastric pain. General surgery was consulted for this pain. Sounds like patient has been having intermittent pain for the past 2 months. Patient does not think is associated with food. He does not know what makes it better or worse. He denies nausea and vomiting. He denies changes in his bowel habits. He denies recent weight loss. He had a GI cocktail, and currently feels much better. He is on ASA. Patient follow with Dr. Kevon Hernandez.   He underwent EGD/colonoscopy in June which showed duodenitis with intraepithelial lymphocytes, reactive gastropathy that was negative for H. pylori, GERD, reflux esophagitis, he had hyperplastic polyp removed, and lymphocytic colitis. He describes undergoing some type of imaging to work-up his gallbladder, but he was told that his gallbladder is not the source of his pain. CTA AP demonstrating diverticulosis. Cardiac work-up thus far has been negative. Lab work grossly unremarkable. He has been afebrile, and hemodynamically stable. Past Medical History        Past Medical History:   Diagnosis Date    CAD in native artery 11/16/2020    H/O cardiovascular stress test 05/16/2018     lexiscan    Heartburn      Hypertension              Past Surgical History         Past Surgical History:   Procedure Laterality Date    CORONARY ANGIOPLASTY WITH STENT PLACEMENT   11/16/2020     2.25 x 22 Taras, 2.0 x12 Taras to the R-PLD and a 3.0 x 18 Taras to the Mid RCA by Dr. Irasema Lee                Medications Prior to Admission:    Home Medications           Prior to Admission medications   Medication Sig Start Date End Date Taking?  Authorizing Provider   ezetimibe (ZETIA) 10 mg tablet TAKE 1 TABLET BY MOUTH EVERY DAY 6/1/22     Historical Provider, MD   pantoprazole (PROTONIX) 40 MG tablet TAKE ONE TABLET BY MOUTH EVERY MORNING before breakfast 6/13/22     Ammy Rodriguez MD   sucralfate (CARAFATE) 1 GM tablet Take 1 tablet by mouth 4 times daily for 10 days 2/23/22 3/5/22   Natasha Fleeting, DO   ipratropium (ATROVENT) 0.06 % nasal spray 2 sprays by Each Nostril route 4 times daily  Patient not taking: No sig reported 12/3/21     Natasha Fleeting, DO   metoprolol tartrate (LOPRESSOR) 50 MG tablet TAKE ONE TABLET BY MOUTH TWO TIMES A DAY 11/29/21     Benigno Askew MD   amLODIPine (NORVASC) 5 MG tablet Take 1 tablet by mouth daily 11/1/21     Benigno Askew MD   Ascorbic Acid (VITAMIN C PO) Take 3,000 mg by mouth daily  Patient not taking: No sig reported       Historical Provider, MD   aspirin 81 MG EC tablet Take 81 mg by mouth daily       Historical Provider, MD   Flaxseed Linseed, (FLAX SEED OIL) 1000 MG CAPS Take 1,000 mg by mouth daily  Patient not taking: No sig reported       Historical Provider, MD   Cinnamon 500 MG CAPS Take 500 mg by mouth daily  Patient not taking: Reported on 8/3/2022       Historical Provider, MD                  Allergies   Allergen Reactions    Cozaar [Losartan] Other (See Comments)       Dizziness, lightheaded, jittery    Lipitor [Atorvastatin] Other (See Comments)       Muscle pain         Family History         Family History   Problem Relation Age of Onset    COPD Father      Emphysema Father      Coronary Art Dis Maternal Grandfather              Social History            Tobacco Use    Smoking status: Never    Smokeless tobacco: Never   Vaping Use    Vaping Use: Never used   Substance Use Topics    Alcohol use: Not Currently       Comment: rarely    Drug use: No            Review of Systems  General ROS: negative for - chills or fever  Hematological and Lymphatic ROS: negative for - bleeding problems or blood clots  Respiratory ROS: no cough, shortness of breath, or wheezing  Cardiovascular ROS: positive for - chest pain  Gastrointestinal ROS: positive for - abdominal pain  Genito-Urinary ROS: no dysuria, trouble voiding, or hematuria  Musculoskeletal ROS: negative for - muscle pain or muscular weakness        PHYSICAL EXAM:         Vitals:     08/03/22 2005   BP: (!) 130/93   Pulse: 59   Resp: 18   Temp: 98.6 °F (37 °C)   SpO2: 98%         General Appearance:  awake, alert, oriented, in no acute distress  Skin:  Skin color, texture, turgor normal. No rashes or lesions. Head/face:  NCAT  Eyes:  No gross abnormalities. Lungs:  normal work of breathing on room air  Heart:  regular rate, normotensive  Abdomen:  soft, non-tender, non-distended  Extremities: Extremities warm to touch, pink, with no edema.      LABS:     CBC      Recent Labs     08/03/22  0941   WBC 6.3   HGB 15.4   HCT 45.4         BMP      Recent Labs     08/03/22  0941      K 3.9      CO2 21*   BUN made of penetrating atherosclerotic ulcer involving anterior margin of infrarenal abdominal aorta measuring 4 x 3 mm. Common iliac arteries are patent. External iliac arteries are patent. Mild stenosis at origin of celiac trunk. SMA is patent. No stenosis involving the renal arteries. CT chest: No airspace opacity or evidence of pleural effusion. No pneumothorax. The heart is normal in size. No pericardial effusion. No mediastinal lymphadenopathy. Small hiatal hernia. CT abdomen/pelvis: No bowel obstruction, free air, or free fluid. Numerous diverticula involving the left colon and sigmoid colon. No evidence of acute diverticulitis. The appendix is visualized and normal in the right lower quadrant. Liver is unremarkable. Gallbladder is unremarkable. No evidence of acute pancreatitis. Spleen is normal in size. No hydronephrosis. Nonobstructing 2 mm right renal calculus. Nonobstructing 1 mm left renal calculus. Moderate prostatomegaly. Prostate measures 4.2 x 5.4 cm. No compression fracture involving thoracic spine or lumbar spine. 1. No thoracic or abdominal aortic dissection or aneurysm. 2. Atherosclerotic plaque involving the infrarenal abdominal aorta with tiny penetrating atherosclerotic ulcer. 3. No pneumonia or pleural effusion. 4. No acute process in chest, abdomen, or pelvis. 5. Diverticulosis. 6. Nonobstructing right renal calculi. CTA ABDOMEN PELVIS W CONTRAST     Result Date: 8/3/2022  EXAMINATION: CTA OF THE CHEST; CTA OF THE ABDOMEN AND PELVIS WITH CONTRAST 8/3/2022 10:08 am TECHNIQUE: CTA of the chest was performed after the administration of intravenous contrast.  Multiplanar reformatted images are provided for review. MIP images are provided for review.  Automated exposure control, iterative reconstruction, and/or weight based adjustment of the mA/kV was utilized to reduce the radiation dose to as low as reasonably achievable.; CTA of the abdomen and pelvis was performed with the administration of intravenous contrast. Multiplanar reformatted images are provided for review. MIP images are provided for review. Automated exposure control, iterative reconstruction, and/or weight based adjustment of the mA/kV was utilized to reduce the radiation dose to as low as reasonably achievable. COMPARISON: None. HISTORY: ORDERING SYSTEM PROVIDED HISTORY: R/O AORTIC DISSECTION TECHNOLOGIST PROVIDED HISTORY: Reason for exam:->R/O AORTIC DISSECTION Decision Support Exception - unselect if not a suspected or confirmed emergency medical condition->Emergency Medical Condition (MA) What reading provider will be dictating this exam?->CRC FINDINGS: Aorta: Ascending thoracic aorta measures 3.3 cm in diameter. Descending thoracic aorta measures 2.5 cm in diameter. Normal origin of brachiocephalic trunk, left common, and left subclavian arteries at thoracic aortic arch. No thoracic aortic aneurysm or dissection. Calcified and noncalcified atherosclerotic plaque associated with the abdominal aorta. No abdominal aortic aneurysm or dissection. Note made of penetrating atherosclerotic ulcer involving anterior margin of infrarenal abdominal aorta measuring 4 x 3 mm. Common iliac arteries are patent. External iliac arteries are patent. Mild stenosis at origin of celiac trunk. SMA is patent. No stenosis involving the renal arteries. CT chest: No airspace opacity or evidence of pleural effusion. No pneumothorax. The heart is normal in size. No pericardial effusion. No mediastinal lymphadenopathy. Small hiatal hernia. CT abdomen/pelvis: No bowel obstruction, free air, or free fluid. Numerous diverticula involving the left colon and sigmoid colon. No evidence of acute diverticulitis. The appendix is visualized and normal in the right lower quadrant. Liver is unremarkable. Gallbladder is unremarkable. No evidence of acute pancreatitis. Spleen is normal in size. No hydronephrosis.  Nonobstructing 2 mm right renal calculus. Nonobstructing 1 mm left renal calculus. Moderate prostatomegaly. Prostate measures 4.2 x 5.4 cm. No compression fracture involving thoracic spine or lumbar spine. 1. No thoracic or abdominal aortic dissection or aneurysm. 2. Atherosclerotic plaque involving the infrarenal abdominal aorta with tiny penetrating atherosclerotic ulcer. 3. No pneumonia or pleural effusion. 4. No acute process in chest, abdomen, or pelvis. 5. Diverticulosis. 6. Nonobstructing right renal calculi. ASSESSMENT:  61 y.o. male with likely GERD. PLAN:  Resume home PPI/Carafate  Okay for bland diet as tolerated  Cardiac work-up per cardiology  No plans to repeat endoscopy at this time as patient recently had EGD/colonoscopy.

## 2022-08-04 NOTE — DISCHARGE SUMMARY
Hospitalist Discharge Summary    Patient ID: Adrian Lunsford   Patient : 1955  Patient's PCP: Yougn Seaman DO    Admit Date: 8/3/2022   Admitting Physician: No admitting provider for patient encounter. Discharge Date:  2022   Discharge Physician: TELMA Cheung NP   Discharge Condition: Stable  Discharge Disposition: [facility] course in brief:  (Please refer to daily progress notes for a comprehensive review of the hospitalization by requesting medical records)    Patient admitted for chest pain. Patient presented to the ED with complaints of chest pain, epigastric pain, nausea, shortness of breath, diaphoresis. He has a history of CAD status post PCI in . ED work-up was largely unremarkable. Symptoms improved with nitro and a GI cocktail. Cardiology was consulted for further evaluation. General surgery was consulted for suspected esophagitis/gastritis/PUD. GS recommending OP follow up with GI. His stress test was low risk. Cardiology has signed off. He is now stable for discharge home with OP follow up. Consults:   IP CONSULT TO CARDIOLOGY  IP CONSULT TO HOSPITALIST  IP CONSULT TO GENERAL SURGERY    Discharge Diagnoses:  Chest pain - resolved  Epigastric pain - resolved  Nausea - resolved  GERD  Shortness of breath  Bradycardia  CAD status post PCI   Intolerant of statins  Hypertension  IBS    Discharge Instructions / Follow up: Follow-up with PCP within 1 week of discharge. Follow-up with GI per general surgery recommendations. Compliance with medications as prescribed on discharge. The patient's condition is stable. At this time the patient is without objective evidence of an acute process requiring continuing hospitalization or inpatient management. They are stable for discharge with outpatient follow-up.      I have spoken with the patient and discussed the results of the current hospitalization, in addition to providing specific details for the plan of care and counseling regarding the diagnosis and prognosis. The plan has been discussed in detail and they are aware of the specific conditions for emergent return, as well as the importance of follow-up. Their questions are answered at this time and they are agreeable with the plan for discharge home. Continued appropriate risk factor modification of blood pressure, diabetes and serum lipids will remain essential to reducing risk of future atherosclerotic development    Activity: activity as tolerated      Significant labs:  CBC:   Recent Labs     08/03/22  0941 08/04/22  0809   WBC 6.3 7.8   RBC 4.96 5.06   HGB 15.4 16.1   HCT 45.4 46.4   MCV 91.5 91.7   RDW 13.3 13.6    227     BMP:   Recent Labs     08/03/22  0941 08/04/22  0809    140   K 3.9 4.2    103   CO2 21* 25   BUN 21 14   CREATININE 1.1 1.0   MG  --  2.3     LFT:  Recent Labs     08/03/22  0941   PROT 6.7   ALKPHOS 132*   ALT 38   AST 25   BILITOT 0.4     PT/INR: No results for input(s): INR, APTT in the last 72 hours. BNP: No results for input(s): BNP in the last 72 hours. Hgb A1C:   Lab Results   Component Value Date    LABA1C 6.2 (H) 08/04/2020     Folate and B12: No results found for: Ebb Robes, No results found for: FOLATE  Thyroid Studies:   Lab Results   Component Value Date    TSH 2.070 08/04/2020       Urinalysis:    Lab Results   Component Value Date/Time    NITRU POSITIVE 06/24/2022 11:24 AM    WBCUA 1-3 06/24/2022 11:24 AM    BACTERIA RARE 06/24/2022 11:24 AM    RBCUA NONE 06/24/2022 11:24 AM    BLOODU Negative 06/24/2022 11:24 AM    SPECGRAV 1.010 06/24/2022 11:24 AM    GLUCOSEU Negative 06/24/2022 11:24 AM       Imaging:  US GALLBLADDER RUQ    Result Date: 8/4/2022  EXAMINATION: RIGHT UPPER QUADRANT ULTRASOUND 8/4/2022 10:37 am COMPARISON: None. HISTORY: ORDERING SYSTEM PROVIDED HISTORY: RUQ pain. TECHNOLOGIST PROVIDED HISTORY: Reason for exam:->RUQ pain.  What reading provider will be dictating this exam?->CRC FINDINGS: LIVER:  The liver demonstrates normal echogenicity without evidence of intrahepatic biliary ductal dilatation. BILIARY SYSTEM:  Gallbladder is revealing a small amount of pericholecystic fluid and thickening of the gallbladder wall with no definite evidence of stones. .  Negative sonographic Lo's sign. Common bile duct is within normal limits measuring . OTHER: No evidence of right upper quadrant ascites. There is mild thickening identified the gallbladder wall with pericholecystic fluid. No definite stones or biliary ductal dilatation peer     CTA CHEST W CONTRAST    Result Date: 8/3/2022  EXAMINATION: CTA OF THE CHEST; CTA OF THE ABDOMEN AND PELVIS WITH CONTRAST 8/3/2022 10:08 am TECHNIQUE: CTA of the chest was performed after the administration of intravenous contrast.  Multiplanar reformatted images are provided for review. MIP images are provided for review. Automated exposure control, iterative reconstruction, and/or weight based adjustment of the mA/kV was utilized to reduce the radiation dose to as low as reasonably achievable.; CTA of the abdomen and pelvis was performed with the administration of intravenous contrast. Multiplanar reformatted images are provided for review. MIP images are provided for review. Automated exposure control, iterative reconstruction, and/or weight based adjustment of the mA/kV was utilized to reduce the radiation dose to as low as reasonably achievable. COMPARISON: None. HISTORY: ORDERING SYSTEM PROVIDED HISTORY: R/O AORTIC DISSECTION TECHNOLOGIST PROVIDED HISTORY: Reason for exam:->R/O AORTIC DISSECTION Decision Support Exception - unselect if not a suspected or confirmed emergency medical condition->Emergency Medical Condition (MA) What reading provider will be dictating this exam?->CRC FINDINGS: Aorta: Ascending thoracic aorta measures 3.3 cm in diameter. Descending thoracic aorta measures 2.5 cm in diameter.   Normal origin of for review. MIP images are provided for review. Automated exposure control, iterative reconstruction, and/or weight based adjustment of the mA/kV was utilized to reduce the radiation dose to as low as reasonably achievable.; CTA of the abdomen and pelvis was performed with the administration of intravenous contrast. Multiplanar reformatted images are provided for review. MIP images are provided for review. Automated exposure control, iterative reconstruction, and/or weight based adjustment of the mA/kV was utilized to reduce the radiation dose to as low as reasonably achievable. COMPARISON: None. HISTORY: ORDERING SYSTEM PROVIDED HISTORY: R/O AORTIC DISSECTION TECHNOLOGIST PROVIDED HISTORY: Reason for exam:->R/O AORTIC DISSECTION Decision Support Exception - unselect if not a suspected or confirmed emergency medical condition->Emergency Medical Condition (MA) What reading provider will be dictating this exam?->CRC FINDINGS: Aorta: Ascending thoracic aorta measures 3.3 cm in diameter. Descending thoracic aorta measures 2.5 cm in diameter. Normal origin of brachiocephalic trunk, left common, and left subclavian arteries at thoracic aortic arch. No thoracic aortic aneurysm or dissection. Calcified and noncalcified atherosclerotic plaque associated with the abdominal aorta. No abdominal aortic aneurysm or dissection. Note made of penetrating atherosclerotic ulcer involving anterior margin of infrarenal abdominal aorta measuring 4 x 3 mm. Common iliac arteries are patent. External iliac arteries are patent. Mild stenosis at origin of celiac trunk. SMA is patent. No stenosis involving the renal arteries. CT chest: No airspace opacity or evidence of pleural effusion. No pneumothorax. The heart is normal in size. No pericardial effusion. No mediastinal lymphadenopathy. Small hiatal hernia. CT abdomen/pelvis: No bowel obstruction, free air, or free fluid.   Numerous diverticula involving the left colon and sigmoid colon. No evidence of acute diverticulitis. The appendix is visualized and normal in the right lower quadrant. Liver is unremarkable. Gallbladder is unremarkable. No evidence of acute pancreatitis. Spleen is normal in size. No hydronephrosis. Nonobstructing 2 mm right renal calculus. Nonobstructing 1 mm left renal calculus. Moderate prostatomegaly. Prostate measures 4.2 x 5.4 cm. No compression fracture involving thoracic spine or lumbar spine. 1. No thoracic or abdominal aortic dissection or aneurysm. 2. Atherosclerotic plaque involving the infrarenal abdominal aorta with tiny penetrating atherosclerotic ulcer. 3. No pneumonia or pleural effusion. 4. No acute process in chest, abdomen, or pelvis. 5. Diverticulosis. 6. Nonobstructing right renal calculi. NM Cardiac Stress Test Nuclear Imaging    Result Date: 8/4/2022  INDICATION: Chest pain PROTOCOL: Rest/stress single isotope myocardial perfusion imaging with pharmacologic stress and gated SPECT imaging utilizing regadenoson. Reconstruction and reorientation of SPECT images in the short, vertical and horizontal long axis. Gated SPECT wall motion study with quantitative assessment of left ventricular ejection fraction. Low-dose computed tomography for attenuation correction. Regadenoson dose: 0.4 mg Radiopharmaceutical stress dose: 33 mCi Tc-99m sestamibi Radiopharmaceutical rest dose: 12 mCi Tc-99m sestamibi FINDINGS: The technical quality of the study is fair. Rotating planar images demonstrate diaphragmatic attenuation and extracardiac uptake overlying the inferior wall. Nondiagnostic CT images demonstrate RCA stent otherwise no coronary artery calcification noted in the left system. The heart appears normal in size on both rest and stress images. Post-stress tomographic images demonstrate homogeneous radiotracer uptake without fixed or reversible defects.  Gated images demonstrate mild hypokinesis of the inferoseptum, anteroseptal and apex with a calculated left ventricular ejection fraction of 54%. End-diastolic volume 638 mL. TID ratio 0.97.     1. The SPECT myocardial perfusion is normal. 2. No evidence of stress-induced ischemia or prior myocardial infarction. 3. Normal LV systolic function, EF 09% with above-noted wall motion abnormalities. 4. There is no transient ischemic dilation. 5. Low risk myocardial perfusion study. Discharge Medications:      Medication List        CONTINUE taking these medications      amLODIPine 5 MG tablet  Commonly known as: Norvasc  Take 1 tablet by mouth daily     aspirin 81 MG EC tablet     ezetimibe 10 MG tablet  Commonly known as: ZETIA     metoprolol succinate 50 MG extended release tablet  Commonly known as: TOPROL XL     pantoprazole 40 MG tablet  Commonly known as: PROTONIX  TAKE ONE TABLET BY MOUTH EVERY MORNING before breakfast     sucralfate 1 GM tablet  Commonly known as: Carafate  Take 1 tablet by mouth in the morning and 1 tablet at noon and 1 tablet in the evening and 1 tablet before bedtime.             STOP taking these medications      Cinnamon 500 MG Caps     Flax Seed Oil 1000 MG Caps     ipratropium 0.06 % nasal spray  Commonly known as: ATROVENT     metoprolol tartrate 50 MG tablet  Commonly known as: LOPRESSOR     VITAMIN C PO               Where to Get Your Medications        These medications were sent to [facility] [address] [id number]      Phone: [phone]   pantoprazole 40 MG tablet  sucralfate 1 GM tablet         Time Spent on discharge is more than 45 minutes in the examination, evaluation, counseling and review of medications and discharge plan.    +++++++++++++++++++++++++++++++++++++++++++++++++  TELMA William - NP  [facility] [address] [place] [address]  +++++++++++++++++++++++++++++++++++++++++++++++++  NOTE: This report was transcribed using voice recognition software. Every effort was made to ensure accuracy; however, inadvertent computerized transcription errors may be present.

## 2022-08-04 NOTE — CONSULTS
GENERAL SURGERY  CONSULT NOTE  8/3/2022    Physician Consulted: Dr. Kymberly Carroll  Reason for Consult: epigastric pain  Referring Physician: Dr. Chicho RICE  Shayan Ames is a 61 y.o. male who presents for evaluation of epigastric pain. General surgery was consulted for this pain. Sounds like patient has been having intermittent pain for the past 2 months. Patient does not think is associated with food. He does not know what makes it better or worse. He denies nausea and vomiting. He denies changes in his bowel habits. He denies recent weight loss. He had a GI cocktail, and currently feels much better. He is on ASA. Patient follow with Dr. Kevon Hernandez. He underwent EGD/colonoscopy in June which showed duodenitis with intraepithelial lymphocytes, reactive gastropathy that was negative for H. pylori, GERD, reflux esophagitis, he had hyperplastic polyp removed, and lymphocytic colitis. He describes undergoing some type of imaging to work-up his gallbladder, but he was told that his gallbladder is not the source of his pain. CTA AP demonstrating diverticulosis. Cardiac work-up thus far has been negative. Lab work grossly unremarkable. He has been afebrile, and hemodynamically stable. Past Medical History:   Diagnosis Date    CAD in native artery 11/16/2020    H/O cardiovascular stress test 05/16/2018    lexiscan    Heartburn     Hypertension        Past Surgical History:   Procedure Laterality Date    CORONARY ANGIOPLASTY WITH STENT PLACEMENT  11/16/2020    2.25 x 22 [place], 2.0 x12 [place] to the R-PLD and a 3.0 x 18 [place] to the Mid RCA by Dr. Elizabeth Porras         Medications Prior to Admission:    Prior to Admission medications    Medication Sig Start Date End Date Taking?  Authorizing Provider   ezetimibe (ZETIA) 10 mg tablet TAKE 1 TABLET BY MOUTH EVERY DAY 6/1/22   Historical Provider, MD   pantoprazole (PROTONIX) 40 MG tablet TAKE ONE TABLET BY MOUTH EVERY MORNING before breakfast 6/13/22 Ivan Mckinney MD   sucralfate (CARAFATE) 1 GM tablet Take 1 tablet by mouth 4 times daily for 10 days 2/23/22 3/5/22  Jakub Baum DO   ipratropium (ATROVENT) 0.06 % nasal spray 2 sprays by Each Nostril route 4 times daily  Patient not taking: No sig reported 12/3/21   Jakub Baum DO   metoprolol tartrate (LOPRESSOR) 50 MG tablet TAKE ONE TABLET BY MOUTH TWO TIMES A DAY 11/29/21   Richmond Astudillo MD   amLODIPine (NORVASC) 5 MG tablet Take 1 tablet by mouth daily 11/1/21   Richmond Astudillo MD   Ascorbic Acid (VITAMIN C PO) Take 3,000 mg by mouth daily  Patient not taking: No sig reported    Historical Provider, MD   aspirin 81 MG EC tablet Take 81 mg by mouth daily    Historical Provider, MD   Flaxseed, Linseed, (FLAX SEED OIL) 1000 MG CAPS Take 1,000 mg by mouth daily  Patient not taking: No sig reported    Historical Provider, MD   Cinnamon 500 MG CAPS Take 500 mg by mouth daily  Patient not taking: Reported on 8/3/2022    Historical Provider, MD       Allergies   Allergen Reactions    Cozaar [Losartan] Other (See Comments)     Dizziness, lightheaded, jittery    Lipitor [Atorvastatin] Other (See Comments)     Muscle pain       Family History   Problem Relation Age of Onset    COPD Father     Emphysema Father     Coronary Art Dis Maternal Grandfather        Social History     Tobacco Use    Smoking status: Never    Smokeless tobacco: Never   Vaping Use    Vaping Use: Never used   Substance Use Topics    Alcohol use: Not Currently     Comment: rarely    Drug use: No         Review of Systems   General ROS: negative for - chills or fever  Hematological and Lymphatic ROS: negative for - bleeding problems or blood clots  Respiratory ROS: no cough, shortness of breath, or wheezing  Cardiovascular ROS: positive for - chest pain  Gastrointestinal ROS: positive for - abdominal pain  Genito-Urinary ROS: no dysuria, trouble voiding, or hematuria  Musculoskeletal ROS: negative for - muscle pain or muscular weakness      PHYSICAL EXAM:    Vitals:    08/03/22 2005   BP: (!) 130/93   Pulse: 59   Resp: 18   Temp: 98.6 °F (37 °C)   SpO2: 98%       General Appearance:  awake, alert, oriented, in no acute distress  Skin:  Skin color, texture, turgor normal. No rashes or lesions. Head/face:  NCAT  Eyes:  No gross abnormalities. Lungs:  normal work of breathing on room air  Heart:  regular rate, normotensive  Abdomen:  soft, non-tender, non-distended  Extremities: Extremities warm to touch, pink, with no edema. LABS:    CBC  Recent Labs     08/03/22  0941   WBC 6.3   HGB 15.4   HCT 45.4        BMP  Recent Labs     08/03/22  0941      K 3.9      CO2 21*   BUN 21   CREATININE 1.1   CALCIUM 9.6     Liver Function  Recent Labs     08/03/22  0941   BILITOT 0.4   AST 25   ALT 38   ALKPHOS 132*   PROT 6.7   LABALBU 4.4     No results for input(s): LACTATE in the last 72 hours. No results for input(s): INR, PTT in the last 72 hours. Invalid input(s): PT    RADIOLOGY    CTA CHEST W CONTRAST    Result Date: 8/3/2022  EXAMINATION: CTA OF THE CHEST; CTA OF THE ABDOMEN AND PELVIS WITH CONTRAST 8/3/2022 10:08 am TECHNIQUE: CTA of the chest was performed after the administration of intravenous contrast.  Multiplanar reformatted images are provided for review. MIP images are provided for review. Automated exposure control, iterative reconstruction, and/or weight based adjustment of the mA/kV was utilized to reduce the radiation dose to as low as reasonably achievable.; CTA of the abdomen and pelvis was performed with the administration of intravenous contrast. Multiplanar reformatted images are provided for review. MIP images are provided for review. Automated exposure control, iterative reconstruction, and/or weight based adjustment of the mA/kV was utilized to reduce the radiation dose to as low as reasonably achievable. COMPARISON: None.  HISTORY: ORDERING SYSTEM PROVIDED HISTORY: R/O AORTIC DISSECTION TECHNOLOGIST PROVIDED HISTORY: Reason for exam:->R/O AORTIC DISSECTION Decision Support Exception - unselect if not a suspected or confirmed emergency medical condition->Emergency Medical Condition (MA) What reading provider will be dictating this exam?->CRC FINDINGS: Aorta: Ascending thoracic aorta measures 3.3 cm in diameter. Descending thoracic aorta measures 2.5 cm in diameter. Normal origin of brachiocephalic trunk, left common, and left subclavian arteries at thoracic aortic arch. No thoracic aortic aneurysm or dissection. Calcified and noncalcified atherosclerotic plaque associated with the abdominal aorta. No abdominal aortic aneurysm or dissection. Note made of penetrating atherosclerotic ulcer involving anterior margin of infrarenal abdominal aorta measuring 4 x 3 mm. Common iliac arteries are patent. External iliac arteries are patent. Mild stenosis at origin of celiac trunk. SMA is patent. No stenosis involving the renal arteries. CT chest: No airspace opacity or evidence of pleural effusion. No pneumothorax. The heart is normal in size. No pericardial effusion. No mediastinal lymphadenopathy. Small hiatal hernia. CT abdomen/pelvis: No bowel obstruction, free air, or free fluid. Numerous diverticula involving the left colon and sigmoid colon. No evidence of acute diverticulitis. The appendix is visualized and normal in the right lower quadrant. Liver is unremarkable. Gallbladder is unremarkable. No evidence of acute pancreatitis. Spleen is normal in size. No hydronephrosis. Nonobstructing 2 mm right renal calculus. Nonobstructing 1 mm left renal calculus. Moderate prostatomegaly. Prostate measures 4.2 x 5.4 cm. No compression fracture involving thoracic spine or lumbar spine. 1. No thoracic or abdominal aortic dissection or aneurysm. 2. Atherosclerotic plaque involving the infrarenal abdominal aorta with tiny penetrating atherosclerotic ulcer.  3. No pneumonia or pleural effusion. 4. No acute process in chest, abdomen, or pelvis. 5. Diverticulosis. 6. Nonobstructing right renal calculi. CTA ABDOMEN PELVIS W CONTRAST    Result Date: 8/3/2022  EXAMINATION: CTA OF THE CHEST; CTA OF THE ABDOMEN AND PELVIS WITH CONTRAST 8/3/2022 10:08 am TECHNIQUE: CTA of the chest was performed after the administration of intravenous contrast.  Multiplanar reformatted images are provided for review. MIP images are provided for review. Automated exposure control, iterative reconstruction, and/or weight based adjustment of the mA/kV was utilized to reduce the radiation dose to as low as reasonably achievable.; CTA of the abdomen and pelvis was performed with the administration of intravenous contrast. Multiplanar reformatted images are provided for review. MIP images are provided for review. Automated exposure control, iterative reconstruction, and/or weight based adjustment of the mA/kV was utilized to reduce the radiation dose to as low as reasonably achievable. COMPARISON: None. HISTORY: ORDERING SYSTEM PROVIDED HISTORY: R/O AORTIC DISSECTION TECHNOLOGIST PROVIDED HISTORY: Reason for exam:->R/O AORTIC DISSECTION Decision Support Exception - unselect if not a suspected or confirmed emergency medical condition->Emergency Medical Condition (MA) What reading provider will be dictating this exam?->CRC FINDINGS: Aorta: Ascending thoracic aorta measures 3.3 cm in diameter. Descending thoracic aorta measures 2.5 cm in diameter. Normal origin of brachiocephalic trunk, left common, and left subclavian arteries at thoracic aortic arch. No thoracic aortic aneurysm or dissection. Calcified and noncalcified atherosclerotic plaque associated with the abdominal aorta. No abdominal aortic aneurysm or dissection. Note made of penetrating atherosclerotic ulcer involving anterior margin of infrarenal abdominal aorta measuring 4 x 3 mm. Common iliac arteries are patent. External iliac arteries are patent. Mild stenosis at origin of celiac trunk. SMA is patent. No stenosis involving the renal arteries. CT chest: No airspace opacity or evidence of pleural effusion. No pneumothorax. The heart is normal in size. No pericardial effusion. No mediastinal lymphadenopathy. Small hiatal hernia. CT abdomen/pelvis: No bowel obstruction, free air, or free fluid. Numerous diverticula involving the left colon and sigmoid colon. No evidence of acute diverticulitis. The appendix is visualized and normal in the right lower quadrant. Liver is unremarkable. Gallbladder is unremarkable. No evidence of acute pancreatitis. Spleen is normal in size. No hydronephrosis. Nonobstructing 2 mm right renal calculus. Nonobstructing 1 mm left renal calculus. Moderate prostatomegaly. Prostate measures 4.2 x 5.4 cm. No compression fracture involving thoracic spine or lumbar spine. 1. No thoracic or abdominal aortic dissection or aneurysm. 2. Atherosclerotic plaque involving the infrarenal abdominal aorta with tiny penetrating atherosclerotic ulcer. 3. No pneumonia or pleural effusion. 4. No acute process in chest, abdomen, or pelvis. 5. Diverticulosis. 6. Nonobstructing right renal calculi. ASSESSMENT:  61 y.o. male with likely GERD. PLAN:  Resume home PPI/Carafate  Okay for bland diet as tolerated  Cardiac work-up per cardiology  No plans to repeat endoscopy at this time as patient recently had EGD/colonoscopy. Patient findings and plan discussed with Dr. Elvia Woodruff.     Electronically signed by Charlotte Lim MD on 8/3/22 at 9:56 PM EDT

## 2022-08-04 NOTE — PROGRESS NOTES
PROGRESS NOTE     CARDIOLOGY    Chief complaint: Seen today for follow up, management & recommendations for chest pain    He denies chest pain or shortness of breath today. He was getting back into bed from the stress lab. He was comfortable and in no distress. Wt Readings from Last 3 Encounters:   08/04/22 178 lb 9.6 oz (81 kg)   06/24/22 175 lb (79.4 kg)   06/13/22 179 lb (81.2 kg)     Temp Readings from Last 3 Encounters:   08/04/22 98.8 °F (37.1 °C) (Temporal)   06/24/22 97.3 °F (36.3 °C) (Oral)   06/13/22 97.3 °F (36.3 °C)     BP Readings from Last 3 Encounters:   08/04/22 109/77   06/24/22 120/88   06/13/22 120/70     Pulse Readings from Last 3 Encounters:   08/04/22 62   06/24/22 75   06/13/22 70       No intake or output data in the 24 hours ending 08/04/22 1417    Recent Labs     08/03/22  0941 08/04/22  0809   WBC 6.3 7.8   HGB 15.4 16.1   HCT 45.4 46.4   MCV 91.5 91.7    227     Recent Labs     08/03/22  0941 08/04/22  0809    140   K 3.9 4.2    103   CO2 21* 25   BUN 21 14   CREATININE 1.1 1.0   MG  --  2.3     No results for input(s): PROTIME, INR in the last 72 hours. No results for input(s): CKTOTAL, CKMB, CKMBINDEX, TROPONINI in the last 72 hours. No results for input(s): BNP in the last 72 hours. No results for input(s): CHOL, HDL, TRIG in the last 72 hours.     Invalid input(s): CHOLHDLR, LDLCALCU  Recent Labs     08/03/22  0941 08/03/22  1049   TROPHS 11 11         amLODIPine (NORVASC) tablet 5 mg, Daily  ezetimibe (ZETIA) tablet 10 mg, Daily  metoprolol succinate (TOPROL XL) extended release tablet 50 mg, Daily  sodium chloride flush 0.9 % injection 5-40 mL, 2 times per day  sodium chloride flush 0.9 % injection 5-40 mL, PRN  0.9 % sodium chloride infusion, PRN  ondansetron (ZOFRAN-ODT) disintegrating tablet 4 mg, Q8H PRN   Or  ondansetron (ZOFRAN) injection 4 mg, Q6H PRN  acetaminophen (TYLENOL) tablet 650 mg, Q6H PRN   Or  acetaminophen (TYLENOL) suppository 650 mg, Q6H PRN  polyethylene glycol (GLYCOLAX) packet 17 g, Daily PRN  aspirin chewable tablet 81 mg, Daily  nitroGLYCERIN (NITROSTAT) SL tablet 0.4 mg, Q5 Min PRN  enoxaparin (LOVENOX) injection 40 mg, Daily  aluminum & magnesium hydroxide-simethicone (MAALOX) 200-200-20 MG/5ML suspension 30 mL, Q6H PRN  pantoprazole (PROTONIX) 40 mg in sodium chloride (PF) 10 mL injection, BID  morphine (PF) injection 2 mg, Q4H PRN  sucralfate (CARAFATE) tablet 1 g, 4 times per day        Review of systems:     Heart: as above   Lungs: as above   Eyes: denies changes in vision or discharge. Ears: denies changes in hearing or pain. Nose: denies epistaxis or masses   Throat: denies sore throat or trouble swallowing. Neuro: denies numbness, tingling, tremors. Skin: denies rashes or itching. : denies hematuria, dysuria   GI: denies vomiting, diarrhea   Psych: denies mood changed, anxiety, depression. Physical exam:    Constitutional: A&O x3, communicates well, no acute distress. Eyes: extraocular muscles intact, PERRL. Normal lids & conjunctiva. No icterus. ENT: clear, no bleeding. No external masses. Lips normal formation. Neck: supple, full ROM, no JVD, no bruits, no lymphadenopathy. No masses. trachea midline. Heart: regular rate & rhythm, normal S1 & S2, no abnormal murmurs. No heave. Lungs: CTA. No accessory muscles. Abd: soft, non-tender. Normal bowel sounds. Neuro: Full ROM X 4, EOMI, no tremors. EXT: No bilateral lower extremity edema  Skin: warm, dry, intact. Good turgor. Psych: A&O x 3, normal behavior, not anxious. Assessment/Recommendations  Atypical chest discomfort lasting all day. Negative ECG and enzymes. Stress test is pending. Coronary artery disease  Intolerant of statins  Hypertension  Colitis. Now placed on steroids. Bradycardia. Heart rate and blood pressure have improved today. No need to change medications at this time.   Okay for discharge with cardiology if the stress test is negative. Note: This report was completed using computerized voice recognition software. Every effort has been made to ensure accuracy, however; and invert and computerized transcription errors may be present.

## 2022-08-04 NOTE — PROCEDURES
Pharmacologic Nuclear Stress Test    Date: 8/4/2022    Indication: Chest pain, CAD    Description of procedure:    Protocol: Regadenoson stress SPECT myocardial perfusion imaging    Baseline EKG: SB 57 bpm. Normal axis/intervals. No ST/T changes. Baseline BP: 132/88 mmHg    0.4 mg of regadenoson was injected followed by radiotracer injection. Patient reported no chest pain. Stress EKG showed no evidence of ischemia, and no arrhythmias were noted. Impression:  No evidence of regadenoson induced ischemia on stress EKG. Nuclear images to be reported separately.     Leslie Haile MD, [id number]

## 2022-08-04 NOTE — PROGRESS NOTES
CLINICAL PHARMACY NOTE: MEDS TO BEDS    Total # of Prescriptions Filled: 2   The following medications were delivered to the patient:  Sucralfate 1  Pantoprazole 40    Additional Documentation:

## 2022-08-04 NOTE — PROGRESS NOTES
Hospitalist Progress Note      Synopsis: Patient admitted for chest pain. Patient presented to the ED with complaints of chest pain, epigastric pain, nausea, shortness of breath, diaphoresis. He has a history of CAD status post PCI in . ED work-up was largely unremarkable. Symptoms improved with nitro and a GI cocktail. Cardiology was consulted for further evaluation. General surgery was consulted for suspected esophagitis/gastritis/PUD. GS recommending OP follow up with GI. His stress test is pending. Hospital day 0     Subjective:  Stable overnight. No issues reported. Patient seen and examined. Doing well. No further pain. Admits to not taking prescribed protonix. Also reports that he ate hot wings prior to this episode beginning. Records reviewed. Temp (24hrs), Av.4 °F (36.9 °C), Min:98 °F (36.7 °C), Max:98.8 °F (37.1 °C)    DIET: Diet NPO  CODE: Full Code  No intake or output data in the 24 hours ending 22 0906    Review of Systems: All bolded are positive; please see HPI  General:  Fever, chills, diaphoresis, fatigue, malaise, night sweats, weight loss  Psychological:  Anxiety, disorientation, hallucinations. ENT:  Epistaxis, headaches, vertigo, visual changes. Cardiovascular:  Chest pain, irregular heartbeats, palpitations, paroxysmal nocturnal dyspnea. Respiratory:  Shortness of breath, coughing, sputum production, hemoptysis, wheezing, orthopnea.   Gastrointestinal:  Nausea, vomiting, diarrhea, heartburn, constipation, abdominal pain, hematemesis, hematochezia, melena, acholic stools  Genito-Urinary:  Dysuria, urgency, frequency, hematuria  Musculoskeletal:  Joint pain, joint stiffness, joint swelling, muscle pain  Neurology:  Headache, focal neurological deficits, weakness, numbness, paresthesia  Derm:  Rashes, ulcers, excoriations, bruising  Extremities:  Decreased ROM, peripheral edema, mottling    Objective:    /77   Pulse 62   Temp 98.8 °F (37.1 °C) (Temporal)   Resp 18   Ht 6' (1.829 m)   Wt 178 lb 9.6 oz (81 kg)   SpO2 96%   BMI 24.22 kg/m²     General appearance: Middle aged male in no apparent distress, appears stated age and cooperative. HEENT: Conjunctivae/corneas clear. Mucous membranes moist.  Neck: Supple. No JVD. Respiratory:  Clear to auscultation bilaterally. Normal respiratory effort. Cardiovascular:  RRR. S1, S2 without MRG. PV: Pulses palpable. No edema. Abdomen: Soft, non-tender, non-distended. +BS  Musculoskeletal: No obvious deformities. Skin: Normal skin color. No rashes or lesions. Good turgor. Neurologic:  Grossly non-focal. Awake, alert, following commands. Psychiatric: Alert and oriented, thought content appropriate, normal insight and judgement    Medications:  REVIEWED DAILY    Infusion Medications    sodium chloride       Scheduled Medications    sodium chloride flush  5-40 mL IntraVENous 2 times per day    aspirin  81 mg Oral Daily    enoxaparin  40 mg SubCUTAneous Daily    pantoprazole (PROTONIX) 40 mg injection  40 mg IntraVENous BID    sucralfate  1 g Oral 4 times per day     PRN Meds: sodium chloride flush, sodium chloride, ondansetron **OR** ondansetron, acetaminophen **OR** acetaminophen, polyethylene glycol, nitroGLYCERIN, aluminum & magnesium hydroxide-simethicone, regadenoson, morphine    Labs:     Recent Labs     08/03/22  0941 08/04/22  0809   WBC 6.3 7.8   HGB 15.4 16.1   HCT 45.4 46.4    227       Recent Labs     08/03/22  0941      K 3.9      CO2 21*   BUN 21   CREATININE 1.1   CALCIUM 9.6       Recent Labs     08/03/22  0941   PROT 6.7   ALKPHOS 132*   ALT 38   AST 25   BILITOT 0.4       No results for input(s): INR in the last 72 hours. No results for input(s): [place] in the last 72 hours.     Chronic labs:    Lab Results   Component Value Date    CHOL 199 08/04/2020    TRIG 177 (H) 08/04/2020    HDL 39 08/04/2020    LDLCALC 125 (H) 08/04/2020    TSH 2.070 08/04/2020 PSA 0.54 08/04/2020    INR 0.9 11/13/2020    LABA1C 6.2 (H) 08/04/2020       Radiology: REVIEWED DAILY    Assessment:  Chest pain - resolved  Epigastric pain - resolved  Nausea - resolved  GERD  Shortness of breath  Bradycardia  CAD status post PCI 2020  Intolerant of statins  Hypertension  IBS    Plan:  Cardiology following  Awaiting stress  Appreciate general surgery consult, recommend outpatient follow-up with GI  Resume home meds  Continue Protonix + Carafate    DVT Prophylaxis [x] Lovenox  []  Heparin [] DOAC [] PCDs [] Ambulation    GI Prophylaxis [x] PPI  [] H2 Blocker   [x] Carafate  [] Diet/Tube Feeds   Level of care [] Med/Surg  [x] Intermediate  []  ICU   Diet Diet NPO    Family contact [x]  N/A  - A&O  [] At bedside  [] Phone call     Discharge Plan: Home pending stress test results    +++++++++++++++++++++++++++++++++++++++++++++++++  [place]  +++++++++++++++++++++++++++++++++++++++++++++++++  NOTE: This report was transcribed using voice recognition software. Every effort was made to ensure accuracy; however, inadvertent computerized transcription errors may be present.

## 2022-08-05 LAB
EKG ATRIAL RATE: 48 BPM
EKG P AXIS: 9 DEGREES
EKG P-R INTERVAL: 142 MS
EKG Q-T INTERVAL: 424 MS
EKG QRS DURATION: 98 MS
EKG QTC CALCULATION (BAZETT): 378 MS
EKG R AXIS: 18 DEGREES
EKG T AXIS: 67 DEGREES
EKG VENTRICULAR RATE: 48 BPM

## 2022-08-16 ENCOUNTER — TELEPHONE (OUTPATIENT)
Dept: CARDIOLOGY CLINIC | Age: 67
End: 2022-08-16

## 2022-10-09 DIAGNOSIS — M25.559 HIP PAIN: Primary | ICD-10-CM

## 2022-10-11 ENCOUNTER — TELEMEDICINE (OUTPATIENT)
Dept: FAMILY MEDICINE CLINIC | Age: 67
End: 2022-10-11
Payer: COMMERCIAL

## 2022-10-11 ENCOUNTER — PATIENT MESSAGE (OUTPATIENT)
Dept: FAMILY MEDICINE CLINIC | Age: 67
End: 2022-10-11

## 2022-10-11 DIAGNOSIS — S39.012D STRAIN OF LUMBAR REGION, SUBSEQUENT ENCOUNTER: Primary | ICD-10-CM

## 2022-10-11 PROCEDURE — 99213 OFFICE O/P EST LOW 20 MIN: CPT | Performed by: FAMILY MEDICINE

## 2022-10-11 RX ORDER — METHYLPREDNISOLONE 4 MG/1
TABLET ORAL
Qty: 1 KIT | Refills: 0 | Status: SHIPPED | OUTPATIENT
Start: 2022-10-11 | End: 2022-10-17

## 2022-10-11 RX ORDER — TIZANIDINE 4 MG/1
4 TABLET ORAL NIGHTLY
Qty: 20 TABLET | Refills: 0 | Status: SHIPPED | OUTPATIENT
Start: 2022-10-11 | End: 2022-10-31

## 2022-10-11 ASSESSMENT — PATIENT HEALTH QUESTIONNAIRE - PHQ9
SUM OF ALL RESPONSES TO PHQ QUESTIONS 1-9: 0
1. LITTLE INTEREST OR PLEASURE IN DOING THINGS: 0
SUM OF ALL RESPONSES TO PHQ9 QUESTIONS 1 & 2: 0
SUM OF ALL RESPONSES TO PHQ QUESTIONS 1-9: 0
2. FEELING DOWN, DEPRESSED OR HOPELESS: 0

## 2022-10-16 ASSESSMENT — ENCOUNTER SYMPTOMS
WHEEZING: 0
DIARRHEA: 0
CONSTIPATION: 0
BACK PAIN: 1
EYE ITCHING: 0
CHEST TIGHTNESS: 0
SHORTNESS OF BREATH: 0
SINUS PRESSURE: 0
STRIDOR: 0
NAUSEA: 0
RECTAL PAIN: 0
SORE THROAT: 0
BLOOD IN STOOL: 0
ANAL BLEEDING: 0
ABDOMINAL PAIN: 0
RHINORRHEA: 0
TROUBLE SWALLOWING: 0
EYE DISCHARGE: 0
PHOTOPHOBIA: 0
VOICE CHANGE: 0
COUGH: 0
ABDOMINAL DISTENTION: 0
EYE REDNESS: 0
VOMITING: 0
FACIAL SWELLING: 0
APNEA: 0
CHOKING: 0
EYE PAIN: 0
COLOR CHANGE: 0

## 2022-10-16 NOTE — PROGRESS NOTES
Jamie Andersen is a 61 y.o. male  . Subjective:      Back Pain  This is a new problem. The current episode started 1 to 4 weeks ago. The problem occurs daily. The problem has been waxing and waning since onset. The pain is present in the lumbar spine. The quality of the pain is described as aching and burning. The pain radiates to the left knee, left thigh, right knee and right thigh. The pain is at a severity of 4/10. The pain is moderate. The symptoms are aggravated by bending and position. Stiffness is present All day. Pertinent negatives include no abdominal pain, chest pain, dysuria, fever, headaches, numbness or weakness. He has tried NSAIDs and chiropractic manipulation for the symptoms. The treatment provided mild relief. Review of Systems   Constitutional:  Positive for fatigue. Negative for activity change, appetite change, chills, diaphoresis, fever and unexpected weight change. HENT:  Negative for congestion, dental problem, drooling, ear discharge, ear pain, facial swelling, hearing loss, mouth sores, nosebleeds, postnasal drip, rhinorrhea, sinus pressure, sneezing, sore throat, tinnitus, trouble swallowing and voice change. Eyes:  Negative for photophobia, pain, discharge, redness, itching and visual disturbance. Respiratory:  Negative for apnea, cough, choking, chest tightness, shortness of breath, wheezing and stridor. Cardiovascular:  Negative for chest pain, palpitations and leg swelling. Gastrointestinal:  Negative for abdominal distention, abdominal pain, anal bleeding, blood in stool, constipation, diarrhea, nausea, rectal pain and vomiting. Endocrine: Negative for cold intolerance, heat intolerance, polydipsia, polyphagia and polyuria. Genitourinary:  Negative for decreased urine volume, difficulty urinating, dysuria, enuresis, flank pain, frequency, genital sores, hematuria, penile discharge, penile pain, penile swelling, scrotal swelling, testicular pain and urgency. Musculoskeletal:  Positive for arthralgias, back pain and myalgias. Negative for gait problem, joint swelling, neck pain and neck stiffness. Skin:  Negative for color change, pallor, rash and wound. Allergic/Immunologic: Negative for environmental allergies, food allergies and immunocompromised state. Neurological:  Negative for dizziness, tremors, seizures, syncope, facial asymmetry, speech difficulty, weakness, light-headedness, numbness and headaches. Hematological:  Negative for adenopathy. Does not bruise/bleed easily. Psychiatric/Behavioral:  Negative for agitation, behavioral problems, confusion, decreased concentration, dysphoric mood, hallucinations, self-injury, sleep disturbance and suicidal ideas. The patient is not nervous/anxious and is not hyperactive.       Past Medical History:   Diagnosis Date    CAD in native artery 11/16/2020    H/O cardiovascular stress test 05/16/2018    lexiscan    Heartburn     Hypertension        Social History     Socioeconomic History    Marital status:      Spouse name: Not on file    Number of children: Not on file    Years of education: Not on file    Highest education level: Not on file   Occupational History    Not on file   Tobacco Use    Smoking status: Never    Smokeless tobacco: Never   Vaping Use    Vaping Use: Never used   Substance and Sexual Activity    Alcohol use: Not Currently     Comment: rarely    Drug use: No    Sexual activity: Not on file   Other Topics Concern    Not on file   Social History Narrative    Not on file     Social Determinants of Health     Financial Resource Strain: Low Risk     Difficulty of Paying Living Expenses: Not hard at all   Food Insecurity: No Food Insecurity    Worried About Running Out of Food in the Last Year: Never true    Ran Out of Food in the Last Year: Never true   Transportation Needs: Not on file   Physical Activity: Not on file   Stress: Not on file   Social Connections: Not on file   Intimate Partner Violence: Not on file   Housing Stability: Not on file       Family History   Problem Relation Age of Onset    COPD Father     Emphysema Father     Coronary Art Dis Maternal Grandfather        Current Outpatient Medications on File Prior to Visit   Medication Sig Dispense Refill    diclofenac (VOLTAREN) 50 MG EC tablet Take 1 tablet by mouth 3 times daily (with meals) 90 tablet 1    metoprolol succinate (TOPROL XL) 50 MG extended release tablet Take 50 mg by mouth in the morning. pantoprazole (PROTONIX) 40 MG tablet TAKE ONE TABLET BY MOUTH EVERY MORNING before breakfast 30 tablet 0    ezetimibe (ZETIA) 10 mg tablet TAKE 1 TABLET BY MOUTH EVERY DAY      amLODIPine (NORVASC) 5 MG tablet Take 1 tablet by mouth daily 90 tablet 3    aspirin 81 MG EC tablet Take 81 mg by mouth daily      sucralfate (CARAFATE) 1 GM tablet Take 1 tablet by mouth in the morning and 1 tablet at noon and 1 tablet in the evening and 1 tablet before bedtime. 120 tablet 0    [DISCONTINUED] ipratropium (ATROVENT) 0.06 % nasal spray 2 sprays by Each Nostril route 4 times daily (Patient not taking: No sig reported) 15 mL 3    [DISCONTINUED] Ascorbic Acid (VITAMIN C PO) Take 3,000 mg by mouth daily (Patient not taking: No sig reported)      [DISCONTINUED] Flaxseed, Linseed, (FLAX SEED OIL) 1000 MG CAPS Take 1,000 mg by mouth daily (Patient not taking: No sig reported)      [DISCONTINUED] Cinnamon 500 MG CAPS Take 500 mg by mouth daily (Patient not taking: Reported on 8/3/2022)       No current facility-administered medications on file prior to visit. Allergies   Allergen Reactions    Cozaar [Losartan] Other (See Comments)     Dizziness, lightheaded, jittery    Lipitor [Atorvastatin] Other (See Comments)     Muscle pain       I have reviewed his allergies, medications, problem list, medical,social and family history and have updated as needed in the electronic medical record.     Objective:     Physical Exam  Constitutional: General: He is not in acute distress. Appearance: Normal appearance. He is normal weight. He is not ill-appearing, toxic-appearing or diaphoretic. HENT:      Head: Normocephalic and atraumatic. Pulmonary:      Comments: No respiratory distress  Skin:     Comments: No rash, no lesion   Neurological:      General: No focal deficit present. Mental Status: He is alert and oriented to person, place, and time. Psychiatric:         Mood and Affect: Mood normal.         Behavior: Behavior normal.         Thought Content: Thought content normal.         Judgment: Judgment normal.       Assessment / Plan:   Elian Rondon was seen today for back pain. Diagnoses and all orders for this visit:    Strain of lumbar region, subsequent encounter  -     methylPREDNISolone (MEDROL DOSEPACK) 4 MG tablet; Take by mouth. -     tiZANidine (ZANAFLEX) 4 MG tablet; Take 1 tablet by mouth at bedtime for 20 days  Patient was given diclofenac but has not started it yet. Chiropractor recommended Medrol Dosepak we will add muscle relaxer. Patient is to keep me posted    Reviewed healthmaintenance report. Patient is aware of deficiencies and suggested preventative tests.

## 2022-12-20 RX ORDER — PANTOPRAZOLE SODIUM 40 MG/1
TABLET, DELAYED RELEASE ORAL
Qty: 90 TABLET | Refills: 3 | Status: SHIPPED | OUTPATIENT
Start: 2022-12-20

## 2023-01-24 ENCOUNTER — OFFICE VISIT (OUTPATIENT)
Dept: NEUROSURGERY | Age: 68
End: 2023-01-24
Payer: MEDICARE

## 2023-01-24 VITALS
HEART RATE: 71 BPM | DIASTOLIC BLOOD PRESSURE: 88 MMHG | TEMPERATURE: 98 F | WEIGHT: 185 LBS | SYSTOLIC BLOOD PRESSURE: 134 MMHG | OXYGEN SATURATION: 99 % | HEIGHT: 72 IN | BODY MASS INDEX: 25.06 KG/M2

## 2023-01-24 DIAGNOSIS — M54.41 ACUTE MIDLINE LOW BACK PAIN WITH RIGHT-SIDED SCIATICA: Primary | ICD-10-CM

## 2023-01-24 PROCEDURE — 99204 OFFICE O/P NEW MOD 45 MIN: CPT | Performed by: NEUROLOGICAL SURGERY

## 2023-01-24 PROCEDURE — 3079F DIAST BP 80-89 MM HG: CPT | Performed by: NEUROLOGICAL SURGERY

## 2023-01-24 PROCEDURE — G8419 CALC BMI OUT NRM PARAM NOF/U: HCPCS | Performed by: NEUROLOGICAL SURGERY

## 2023-01-24 PROCEDURE — 3017F COLORECTAL CA SCREEN DOC REV: CPT | Performed by: NEUROLOGICAL SURGERY

## 2023-01-24 PROCEDURE — 1123F ACP DISCUSS/DSCN MKR DOCD: CPT | Performed by: NEUROLOGICAL SURGERY

## 2023-01-24 PROCEDURE — G8484 FLU IMMUNIZE NO ADMIN: HCPCS | Performed by: NEUROLOGICAL SURGERY

## 2023-01-24 PROCEDURE — G8427 DOCREV CUR MEDS BY ELIG CLIN: HCPCS | Performed by: NEUROLOGICAL SURGERY

## 2023-01-24 PROCEDURE — 99202 OFFICE O/P NEW SF 15 MIN: CPT

## 2023-01-24 PROCEDURE — 3075F SYST BP GE 130 - 139MM HG: CPT | Performed by: NEUROLOGICAL SURGERY

## 2023-01-24 PROCEDURE — 1036F TOBACCO NON-USER: CPT | Performed by: NEUROLOGICAL SURGERY

## 2023-01-24 RX ORDER — BUDESONIDE 3 MG/1
CAPSULE, COATED PELLETS ORAL
COMMUNITY
Start: 2022-12-28

## 2023-01-24 RX ORDER — GABAPENTIN 600 MG/1
TABLET ORAL
COMMUNITY
Start: 2022-12-29

## 2023-01-24 ASSESSMENT — ENCOUNTER SYMPTOMS
RESPIRATORY NEGATIVE: 1
GASTROINTESTINAL NEGATIVE: 1
EYES NEGATIVE: 1
ALLERGIC/IMMUNOLOGIC NEGATIVE: 1
BACK PAIN: 1

## 2023-01-24 NOTE — PROGRESS NOTES
Juan Hylton (:  1955) is a 74 y.o. male,New patient, here for evaluation of the following chief complaint(s):  New Patient (Back pain MRI done)         ASSESSMENT/PLAN:  1. Acute midline low back pain with right-sided sciatica  -     XR LUMBAR SPINE FLEXION AND EXTENSION ONLY; Future  74year old male who presents with back and right leg pain. His MRI shows a large right L3-L4 herniated disk behind the L4 body and stenosis from L3-L5. He has failed physical therapy and epidural injections. I am recommending an L3-L5 laminectomy and right L3-L4 diskectomy. Risks, benefits and alternatives of surgery have been discussed and he wishes to proceed. No follow-ups on file. Subjective   SUBJECTIVE/OBJECTIVE:  HPI  74year old male who presents with a 5 months history of back and right leg pain. The pain is worse with activity and better with rest. He also has right leg weakness. He has occasional numbness and tingling but denies loss of control,of bowel or bladder function the pain is rated as 5/10 with activity. He has tried physical therapy and epidural steroid injections. Review of Systems   Constitutional: Negative. HENT: Negative. Eyes: Negative. Respiratory: Negative. Cardiovascular: Negative. Gastrointestinal: Negative. Endocrine: Negative. Genitourinary: Negative. Musculoskeletal:  Positive for arthralgias and back pain. Skin: Negative. Allergic/Immunologic: Negative. Neurological: Negative. Hematological:  Bruises/bleeds easily. Objective   Physical Exam  Constitutional:       General: He is not in acute distress. Appearance: Normal appearance. He is normal weight. He is not ill-appearing, toxic-appearing or diaphoretic. HENT:      Head: Normocephalic and atraumatic. Nose: Nose normal.   Eyes:      General: No visual field deficit. Extraocular Movements: Extraocular movements intact.       Conjunctiva/sclera: Conjunctivae normal.      Pupils: Pupils are equal, round, and reactive to light. Pulmonary:      Effort: Pulmonary effort is normal. No respiratory distress. Abdominal:      General: Abdomen is flat. There is no distension. Musculoskeletal:         General: No swelling, tenderness, deformity or signs of injury. Normal range of motion. Right lower leg: No edema. Left lower leg: No edema. Skin:     General: Skin is warm and dry. Capillary Refill: Capillary refill takes less than 2 seconds. Coloration: Skin is not jaundiced or pale. Findings: No bruising, erythema, lesion or rash. Neurological:      Mental Status: He is alert and oriented to person, place, and time. Mental status is at baseline. Cranial Nerves: No cranial nerve deficit, dysarthria or facial asymmetry. Sensory: Sensation is intact. No sensory deficit. Motor: Weakness present. No atrophy, abnormal muscle tone, seizure activity or pronator drift. Coordination: Coordination is intact. Coordination normal.      Gait: Gait normal.      Deep Tendon Reflexes: Reflexes normal. Babinski sign absent on the right side. Babinski sign absent on the left side. Reflex Scores:       Tricep reflexes are 2+ on the right side and 2+ on the left side. Bicep reflexes are 2+ on the right side and 2+ on the left side. Brachioradialis reflexes are 2+ on the right side and 2+ on the left side. Patellar reflexes are 2+ on the right side and 2+ on the left side. Achilles reflexes are 2+ on the right side and 2+ on the left side. Comments: 4/5 in RLE   Psychiatric:         Mood and Affect: Mood normal.         Behavior: Behavior normal.         Thought Content:  Thought content normal.         Judgment: Judgment normal.          On this date 1/24/2023 I have spent 45 minutes reviewing previous notes, test results and face to face with the patient discussing the diagnosis and importance of compliance with the treatment plan as well as documenting on the day of the visit. An electronic signature was used to authenticate this note.     --Ilia Ravi MD

## 2023-02-27 RX ORDER — PROMETHAZINE HYDROCHLORIDE 12.5 MG/1
TABLET ORAL
Qty: 40 TABLET | Refills: 0 | Status: SHIPPED | OUTPATIENT
Start: 2023-02-27

## 2023-03-20 ENCOUNTER — TELEPHONE (OUTPATIENT)
Dept: FAMILY MEDICINE CLINIC | Age: 68
End: 2023-03-20

## 2023-08-11 ENCOUNTER — PATIENT MESSAGE (OUTPATIENT)
Dept: FAMILY MEDICINE CLINIC | Age: 68
End: 2023-08-11

## 2023-08-11 NOTE — TELEPHONE ENCOUNTER
From: Yoselyn Coughlin  To: Dr. Duane Murray: 8/11/2023 12:30 PM EDT  Subject: RX refill     My RX ran out couple weeks ago  I need pantoprazole Sodium EC 40 MG Tab  90 day supply to [facility] in [address] [place] [address]    Thank you     Yoselyn Coughlin

## 2023-10-28 DIAGNOSIS — R19.7 DIARRHEA OF PRESUMED INFECTIOUS ORIGIN: ICD-10-CM

## 2023-10-30 RX ORDER — DIPHENOXYLATE HYDROCHLORIDE AND ATROPINE SULFATE 2.5; .025 MG/1; MG/1
TABLET ORAL
Qty: 40 TABLET | Refills: 0 | OUTPATIENT
Start: 2023-10-30

## 2024-01-08 RX ORDER — PANTOPRAZOLE SODIUM 40 MG/1
TABLET, DELAYED RELEASE ORAL
Qty: 90 TABLET | Refills: 0 | OUTPATIENT
Start: 2024-01-08

## 2024-01-15 RX ORDER — PANTOPRAZOLE SODIUM 40 MG/1
TABLET, DELAYED RELEASE ORAL
Qty: 90 TABLET | Refills: 3 | Status: SHIPPED | OUTPATIENT
Start: 2024-01-15

## 2024-05-06 RX ORDER — VALACYCLOVIR HYDROCHLORIDE 1 G/1
1000 TABLET, FILM COATED ORAL 2 TIMES DAILY
Qty: 10 TABLET | Refills: 0 | Status: SHIPPED | OUTPATIENT
Start: 2024-05-06 | End: 2024-05-11

## 2024-05-06 RX ORDER — PREDNISONE 20 MG/1
20 TABLET ORAL DAILY
Qty: 5 TABLET | Refills: 0 | Status: SHIPPED | OUTPATIENT
Start: 2024-05-06 | End: 2024-05-11

## 2024-05-06 RX ORDER — CEFDINIR 300 MG/1
300 CAPSULE ORAL 2 TIMES DAILY
Qty: 20 CAPSULE | Refills: 0 | Status: SHIPPED | OUTPATIENT
Start: 2024-05-06 | End: 2024-05-16

## 2024-12-02 ENCOUNTER — TELEMEDICINE (OUTPATIENT)
Dept: FAMILY MEDICINE CLINIC | Age: 69
End: 2024-12-02

## 2024-12-02 DIAGNOSIS — J01.00 ACUTE NON-RECURRENT MAXILLARY SINUSITIS: Primary | ICD-10-CM

## 2024-12-02 RX ORDER — PREDNISONE 20 MG/1
40 TABLET ORAL DAILY
Qty: 10 TABLET | Refills: 0 | Status: SHIPPED | OUTPATIENT
Start: 2024-12-02 | End: 2024-12-07

## 2024-12-02 RX ORDER — CEFDINIR 300 MG/1
300 CAPSULE ORAL 2 TIMES DAILY
Qty: 20 CAPSULE | Refills: 0 | Status: SHIPPED | OUTPATIENT
Start: 2024-12-02 | End: 2024-12-12

## 2024-12-02 RX ORDER — BROMPHENIRAMINE MALEATE, PSEUDOEPHEDRINE HYDROCHLORIDE, AND DEXTROMETHORPHAN HYDROBROMIDE 2; 30; 10 MG/5ML; MG/5ML; MG/5ML
5 SYRUP ORAL 4 TIMES DAILY PRN
Qty: 180 ML | Refills: 0 | Status: SHIPPED | OUTPATIENT
Start: 2024-12-02

## 2024-12-02 ASSESSMENT — ENCOUNTER SYMPTOMS
BACK PAIN: 0
COLOR CHANGE: 0
ANAL BLEEDING: 0
ABDOMINAL PAIN: 0
CONSTIPATION: 0
SINUS PRESSURE: 1
CHEST TIGHTNESS: 0
STRIDOR: 0
APNEA: 0
CHOKING: 0
WHEEZING: 0
VOICE CHANGE: 0
FACIAL SWELLING: 0
SORE THROAT: 0
BLOOD IN STOOL: 0
RHINORRHEA: 1
DIARRHEA: 0
TROUBLE SWALLOWING: 0
RECTAL PAIN: 0
SINUS PAIN: 1
VOMITING: 0
ABDOMINAL DISTENTION: 0
EYE REDNESS: 0
SHORTNESS OF BREATH: 0
COUGH: 0
EYE DISCHARGE: 0
EYE ITCHING: 0
EYE PAIN: 0
NAUSEA: 0
PHOTOPHOBIA: 0

## 2025-01-06 RX ORDER — AMOXICILLIN 875 MG/1
875 TABLET, COATED ORAL 2 TIMES DAILY
Qty: 20 TABLET | Refills: 0 | Status: SHIPPED | OUTPATIENT
Start: 2025-01-06 | End: 2025-01-16

## 2025-01-06 RX ORDER — PREDNISONE 20 MG/1
40 TABLET ORAL DAILY
Qty: 10 TABLET | Refills: 0 | Status: SHIPPED | OUTPATIENT
Start: 2025-01-06 | End: 2025-01-11

## 2025-01-06 RX ORDER — BENZONATATE 200 MG/1
200 CAPSULE ORAL 3 TIMES DAILY PRN
Qty: 60 CAPSULE | Refills: 0 | Status: SHIPPED | OUTPATIENT
Start: 2025-01-06 | End: 2025-01-26

## 2025-01-06 RX ORDER — DOXYCYCLINE HYCLATE 100 MG
100 TABLET ORAL 2 TIMES DAILY WITH MEALS
Qty: 20 TABLET | Refills: 0 | Status: SHIPPED | OUTPATIENT
Start: 2025-01-06 | End: 2025-01-16

## 2025-01-14 RX ORDER — PANTOPRAZOLE SODIUM 40 MG/1
TABLET, DELAYED RELEASE ORAL
Qty: 90 TABLET | Refills: 3 | Status: SHIPPED | OUTPATIENT
Start: 2025-01-14

## 2025-01-14 RX ORDER — PANTOPRAZOLE SODIUM 40 MG/1
TABLET, DELAYED RELEASE ORAL
Qty: 90 TABLET | Refills: 0 | OUTPATIENT
Start: 2025-01-14

## 2025-03-10 ENCOUNTER — TELEPHONE (OUTPATIENT)
Dept: FAMILY MEDICINE CLINIC | Age: 70
End: 2025-03-10

## 2025-03-10 DIAGNOSIS — R73.01 IFG (IMPAIRED FASTING GLUCOSE): ICD-10-CM

## 2025-03-10 DIAGNOSIS — Z12.5 SCREENING FOR MALIGNANT NEOPLASM OF PROSTATE: ICD-10-CM

## 2025-03-10 DIAGNOSIS — E78.2 MIXED HYPERLIPIDEMIA: Primary | ICD-10-CM

## 2025-03-10 DIAGNOSIS — R53.82 CHRONIC FATIGUE: ICD-10-CM

## 2025-06-26 ENCOUNTER — PATIENT MESSAGE (OUTPATIENT)
Dept: FAMILY MEDICINE CLINIC | Age: 70
End: 2025-06-26

## 2025-06-27 ENCOUNTER — OFFICE VISIT (OUTPATIENT)
Dept: FAMILY MEDICINE CLINIC | Age: 70
End: 2025-06-27
Payer: MEDICARE

## 2025-06-27 VITALS
OXYGEN SATURATION: 98 % | WEIGHT: 185 LBS | TEMPERATURE: 97.4 F | HEIGHT: 72 IN | HEART RATE: 50 BPM | RESPIRATION RATE: 19 BRPM | BODY MASS INDEX: 25.06 KG/M2 | SYSTOLIC BLOOD PRESSURE: 157 MMHG | DIASTOLIC BLOOD PRESSURE: 87 MMHG

## 2025-06-27 DIAGNOSIS — M79.89 TOE SWELLING: ICD-10-CM

## 2025-06-27 DIAGNOSIS — M79.675 PAIN OF LEFT GREAT TOE: Primary | ICD-10-CM

## 2025-06-27 PROCEDURE — G8427 DOCREV CUR MEDS BY ELIG CLIN: HCPCS

## 2025-06-27 PROCEDURE — 1123F ACP DISCUSS/DSCN MKR DOCD: CPT

## 2025-06-27 PROCEDURE — 1160F RVW MEDS BY RX/DR IN RCRD: CPT

## 2025-06-27 PROCEDURE — 3077F SYST BP >= 140 MM HG: CPT

## 2025-06-27 PROCEDURE — 3079F DIAST BP 80-89 MM HG: CPT

## 2025-06-27 PROCEDURE — 99213 OFFICE O/P EST LOW 20 MIN: CPT

## 2025-06-27 PROCEDURE — 1159F MED LIST DOCD IN RCRD: CPT

## 2025-06-27 PROCEDURE — 3017F COLORECTAL CA SCREEN DOC REV: CPT

## 2025-06-27 PROCEDURE — 1036F TOBACCO NON-USER: CPT

## 2025-06-27 PROCEDURE — G8419 CALC BMI OUT NRM PARAM NOF/U: HCPCS

## 2025-06-27 RX ORDER — PREDNISONE 20 MG/1
40 TABLET ORAL DAILY
Qty: 10 TABLET | Refills: 0 | Status: SHIPPED | OUTPATIENT
Start: 2025-06-27 | End: 2025-07-02

## 2025-06-27 RX ORDER — COLCHICINE 0.6 MG/1
0.6 TABLET ORAL DAILY
Qty: 10 TABLET | Refills: 0 | Status: SHIPPED | OUTPATIENT
Start: 2025-06-27 | End: 2025-07-07

## 2025-06-27 SDOH — ECONOMIC STABILITY: FOOD INSECURITY: WITHIN THE PAST 12 MONTHS, THE FOOD YOU BOUGHT JUST DIDN'T LAST AND YOU DIDN'T HAVE MONEY TO GET MORE.: NEVER TRUE

## 2025-06-27 SDOH — ECONOMIC STABILITY: FOOD INSECURITY: WITHIN THE PAST 12 MONTHS, YOU WORRIED THAT YOUR FOOD WOULD RUN OUT BEFORE YOU GOT MONEY TO BUY MORE.: NEVER TRUE

## 2025-06-27 ASSESSMENT — ENCOUNTER SYMPTOMS
SINUS PRESSURE: 0
DIARRHEA: 0
ABDOMINAL PAIN: 0
NAUSEA: 0
RHINORRHEA: 0
VOMITING: 0
WHEEZING: 0
SHORTNESS OF BREATH: 0
COLOR CHANGE: 1
SORE THROAT: 0
BACK PAIN: 0
COUGH: 0
EYE PAIN: 0
EYE DISCHARGE: 0
EYE REDNESS: 0

## 2025-06-27 ASSESSMENT — PATIENT HEALTH QUESTIONNAIRE - PHQ9
SUM OF ALL RESPONSES TO PHQ QUESTIONS 1-9: 0
DEPRESSION UNABLE TO ASSESS: FUNCTIONAL CAPACITY MOTIVATION LIMITS ACCURACY
2. FEELING DOWN, DEPRESSED OR HOPELESS: NOT AT ALL
SUM OF ALL RESPONSES TO PHQ QUESTIONS 1-9: 0
1. LITTLE INTEREST OR PLEASURE IN DOING THINGS: NOT AT ALL

## 2025-06-27 NOTE — TELEPHONE ENCOUNTER
Patient called to follow up on his MyChart msg.  I informed him that it was routed to Dr. Francois yesterday, awaiting response.  Patient stated that his toe has gotten worse, to the point where he can hardly get a shoe on.  I advised him to go to the [facility].  Patient was agreeable and stated he'll be in shortly.

## 2025-06-27 NOTE — PROGRESS NOTES
Chief Complaint:   Toe Pain (Left big toe, painful)      History of Present Illness   HPI:  Román Bennett is a 69 y.o. male who presents to [facility] today for evaluation of left foot pain near great toe which started about a week ago. Pt denies known injury. Pt states since onset symptoms have gotten progressively worse. Pt was messaged by Dr. Francois and informed that prescription for colchicine and prednisone were sent to pharmacy.     Prior to Visit Medications    Medication Sig Taking? Authorizing Provider   pantoprazole (PROTONIX) 40 MG tablet TAKE ONE TABLET BY MOUTH EVERY MORNING before breakfast Yes Roger Francois DO   brompheniramine-pseudoephedrine-DM 2-30-10 MG/5ML syrup Take 5 mLs by mouth 4 times daily as needed for Congestion or Cough Yes Roger Francois DO   promethazine (PHENERGAN) 12.5 MG tablet TAKE 1 TABLET BY MOUTH FOUR TIMES A DAY AS NEEDED FOR NAUSEA Yes Roger Francois DO   budesonide (ENTOCORT EC) 3 MG extended release capsule TAKE 3 CAPSULES BY MOUTH EVERY DAY Yes Ami Puckett MD   gabapentin (NEURONTIN) 600 MG tablet TAKE 1 TABLET BY MOUTH TWICE A DAY Yes Ami Puckett MD   diclofenac (VOLTAREN) 50 MG EC tablet Take 1 tablet by mouth 3 times daily (with meals) Yes Roger Francois DO   metoprolol succinate (TOPROL XL) 50 MG extended release tablet Take 1 tablet by mouth daily Yes Ami Puckett MD   ezetimibe (ZETIA) 10 mg tablet TAKE 1 TABLET BY MOUTH EVERY DAY Yes Ami Puckett MD   amLODIPine (NORVASC) 5 MG tablet Take 1 tablet by mouth daily Yes Mervin Bergeron MD   aspirin 81 MG EC tablet Take 1 tablet by mouth daily Yes Ami Puckett MD   predniSONE (DELTASONE) 20 MG tablet Take 2 tablets by mouth daily for 5 days  Roger Francois DO   colchicine (COLCRYS) 0.6 MG tablet Take 1 tablet by mouth daily for 10 days  Roger Francois DO   sucralfate (CARAFATE) 1 GM tablet Take 1 tablet by mouth in the morning and 1 tablet at noon and

## 2025-07-02 ENCOUNTER — TELEPHONE (OUTPATIENT)
Dept: FAMILY MEDICINE CLINIC | Age: 70
End: 2025-07-02

## 2025-07-02 RX ORDER — INDOMETHACIN 50 MG/1
50 CAPSULE ORAL 2 TIMES DAILY WITH MEALS
Qty: 20 CAPSULE | Refills: 0 | Status: SHIPPED | OUTPATIENT
Start: 2025-07-02 | End: 2025-07-12

## 2025-07-02 NOTE — TELEPHONE ENCOUNTER
Patient called stating he's been taking RX for his Lt great toe, finished the Prednisone and has 2 more days of Colchicine.  Patient stated that his toe is improving, but not fully better.  I informed him that when he was seen at [facility] on 6/27/25, he was advised to follow up w/Dr. Francois if symptoms do not improve.  I offered him an appt today or tomorrow, but patient stated that he really does not want to come in and asked me to send Dr. Francois a msg.    Last seen 12/2/2024  Next appt Visit date not found  [facility]

## 2025-08-04 ENCOUNTER — TELEPHONE (OUTPATIENT)
Dept: FAMILY MEDICINE CLINIC | Age: 70
End: 2025-08-04

## 2025-08-05 DIAGNOSIS — M72.2 PLANTAR FASCIITIS: Primary | ICD-10-CM
